# Patient Record
Sex: MALE | Race: WHITE | Employment: FULL TIME | URBAN - METROPOLITAN AREA
[De-identification: names, ages, dates, MRNs, and addresses within clinical notes are randomized per-mention and may not be internally consistent; named-entity substitution may affect disease eponyms.]

---

## 2017-01-03 ENCOUNTER — OFFICE VISIT (OUTPATIENT)
Dept: FAMILY MEDICINE CLINIC | Age: 27
End: 2017-01-03

## 2017-01-03 VITALS
BODY MASS INDEX: 27.49 KG/M2 | DIASTOLIC BLOOD PRESSURE: 80 MMHG | TEMPERATURE: 98.6 F | WEIGHT: 192 LBS | HEIGHT: 70 IN | HEART RATE: 90 BPM | OXYGEN SATURATION: 98 % | SYSTOLIC BLOOD PRESSURE: 122 MMHG

## 2017-01-03 DIAGNOSIS — J01.10 ACUTE NON-RECURRENT FRONTAL SINUSITIS: Primary | ICD-10-CM

## 2017-01-03 DIAGNOSIS — J45.20 RAD (REACTIVE AIRWAY DISEASE), MILD INTERMITTENT, UNCOMPLICATED: ICD-10-CM

## 2017-01-03 PROCEDURE — 99213 OFFICE O/P EST LOW 20 MIN: CPT | Performed by: PHYSICIAN ASSISTANT

## 2017-01-03 RX ORDER — FLUTICASONE PROPIONATE 50 MCG
1 SPRAY, SUSPENSION (ML) NASAL DAILY
Qty: 1 BOTTLE | Refills: 3 | Status: SHIPPED | OUTPATIENT
Start: 2017-01-03 | End: 2017-05-23 | Stop reason: SDUPTHER

## 2017-01-03 RX ORDER — CEFDINIR 300 MG/1
300 CAPSULE ORAL 2 TIMES DAILY
Qty: 20 CAPSULE | Refills: 0 | Status: SHIPPED | OUTPATIENT
Start: 2017-01-03 | End: 2017-01-13

## 2017-01-04 ENCOUNTER — TELEPHONE (OUTPATIENT)
Dept: FAMILY MEDICINE CLINIC | Age: 27
End: 2017-01-04

## 2017-01-26 DIAGNOSIS — F98.8 ADD (ATTENTION DEFICIT DISORDER): ICD-10-CM

## 2017-01-27 RX ORDER — DEXTROAMPHETAMINE SACCHARATE, AMPHETAMINE ASPARTATE, DEXTROAMPHETAMINE SULFATE AND AMPHETAMINE SULFATE 7.5; 7.5; 7.5; 7.5 MG/1; MG/1; MG/1; MG/1
30 TABLET ORAL DAILY
Qty: 30 TABLET | Refills: 0 | Status: SHIPPED | OUTPATIENT
Start: 2017-01-27 | End: 2017-02-23 | Stop reason: SDUPTHER

## 2017-02-23 DIAGNOSIS — F98.8 ADD (ATTENTION DEFICIT DISORDER): ICD-10-CM

## 2017-02-24 RX ORDER — DEXTROAMPHETAMINE SACCHARATE, AMPHETAMINE ASPARTATE, DEXTROAMPHETAMINE SULFATE AND AMPHETAMINE SULFATE 7.5; 7.5; 7.5; 7.5 MG/1; MG/1; MG/1; MG/1
30 TABLET ORAL DAILY
Qty: 30 TABLET | Refills: 0 | Status: SHIPPED | OUTPATIENT
Start: 2017-02-24 | End: 2017-03-22 | Stop reason: SDUPTHER

## 2017-03-22 DIAGNOSIS — F98.8 ADD (ATTENTION DEFICIT DISORDER): ICD-10-CM

## 2017-03-23 RX ORDER — DEXTROAMPHETAMINE SACCHARATE, AMPHETAMINE ASPARTATE, DEXTROAMPHETAMINE SULFATE AND AMPHETAMINE SULFATE 7.5; 7.5; 7.5; 7.5 MG/1; MG/1; MG/1; MG/1
30 TABLET ORAL DAILY
Qty: 30 TABLET | Refills: 0 | Status: SHIPPED | OUTPATIENT
Start: 2017-03-23 | End: 2017-04-18 | Stop reason: SDUPTHER

## 2017-04-14 DIAGNOSIS — J45.20 RAD (REACTIVE AIRWAY DISEASE), MILD INTERMITTENT, UNCOMPLICATED: Primary | ICD-10-CM

## 2017-04-18 DIAGNOSIS — F98.8 ADD (ATTENTION DEFICIT DISORDER): ICD-10-CM

## 2017-04-19 RX ORDER — DEXTROAMPHETAMINE SACCHARATE, AMPHETAMINE ASPARTATE, DEXTROAMPHETAMINE SULFATE AND AMPHETAMINE SULFATE 7.5; 7.5; 7.5; 7.5 MG/1; MG/1; MG/1; MG/1
30 TABLET ORAL DAILY
Qty: 30 TABLET | Refills: 0 | Status: SHIPPED | OUTPATIENT
Start: 2017-04-19 | End: 2017-05-05 | Stop reason: SDUPTHER

## 2017-05-05 ENCOUNTER — OFFICE VISIT (OUTPATIENT)
Dept: FAMILY MEDICINE CLINIC | Age: 27
End: 2017-05-05

## 2017-05-05 VITALS
TEMPERATURE: 98.2 F | OXYGEN SATURATION: 96 % | SYSTOLIC BLOOD PRESSURE: 122 MMHG | HEART RATE: 98 BPM | DIASTOLIC BLOOD PRESSURE: 66 MMHG | BODY MASS INDEX: 27.57 KG/M2 | WEIGHT: 192.6 LBS | HEIGHT: 70 IN

## 2017-05-05 DIAGNOSIS — F33.0 MAJOR DEPRESSIVE DISORDER, RECURRENT EPISODE, MILD (HCC): ICD-10-CM

## 2017-05-05 DIAGNOSIS — J45.31 RAD (REACTIVE AIRWAY DISEASE), MILD PERSISTENT, WITH ACUTE EXACERBATION: ICD-10-CM

## 2017-05-05 DIAGNOSIS — F98.8 ADD (ATTENTION DEFICIT DISORDER): Primary | ICD-10-CM

## 2017-05-05 PROCEDURE — 99213 OFFICE O/P EST LOW 20 MIN: CPT | Performed by: FAMILY MEDICINE

## 2017-05-05 RX ORDER — PREDNISONE 20 MG/1
TABLET ORAL
Qty: 18 TABLET | Refills: 0 | Status: SHIPPED | OUTPATIENT
Start: 2017-05-05 | End: 2018-01-11

## 2017-05-05 RX ORDER — MONTELUKAST SODIUM 10 MG/1
10 TABLET ORAL DAILY
Qty: 30 TABLET | Refills: 5 | Status: SHIPPED | OUTPATIENT
Start: 2017-05-05 | End: 2017-12-22 | Stop reason: SDUPTHER

## 2017-05-05 RX ORDER — BUPROPION HYDROCHLORIDE 300 MG/1
TABLET ORAL
Qty: 30 TABLET | Refills: 5 | Status: SHIPPED | OUTPATIENT
Start: 2017-05-05 | End: 2017-11-02 | Stop reason: SDUPTHER

## 2017-05-05 RX ORDER — DEXTROAMPHETAMINE SACCHARATE, AMPHETAMINE ASPARTATE, DEXTROAMPHETAMINE SULFATE AND AMPHETAMINE SULFATE 7.5; 7.5; 7.5; 7.5 MG/1; MG/1; MG/1; MG/1
30 TABLET ORAL DAILY
Qty: 30 TABLET | Refills: 0 | Status: SHIPPED | OUTPATIENT
Start: 2017-05-05 | End: 2017-06-16 | Stop reason: SDUPTHER

## 2017-05-08 ASSESSMENT — ENCOUNTER SYMPTOMS
ABDOMINAL PAIN: 0
SHORTNESS OF BREATH: 0

## 2017-05-09 ENCOUNTER — TELEPHONE (OUTPATIENT)
Dept: FAMILY MEDICINE CLINIC | Age: 27
End: 2017-05-09

## 2017-05-23 RX ORDER — FLUTICASONE PROPIONATE 50 MCG
1 SPRAY, SUSPENSION (ML) NASAL DAILY
Qty: 1 BOTTLE | Refills: 3 | Status: SHIPPED | OUTPATIENT
Start: 2017-05-23 | End: 2017-10-09 | Stop reason: SDUPTHER

## 2017-06-15 DIAGNOSIS — F98.8 ADD (ATTENTION DEFICIT DISORDER): ICD-10-CM

## 2017-06-16 RX ORDER — DEXTROAMPHETAMINE SACCHARATE, AMPHETAMINE ASPARTATE, DEXTROAMPHETAMINE SULFATE AND AMPHETAMINE SULFATE 7.5; 7.5; 7.5; 7.5 MG/1; MG/1; MG/1; MG/1
30 TABLET ORAL DAILY
Qty: 30 TABLET | Refills: 0 | Status: SHIPPED | OUTPATIENT
Start: 2017-06-16 | End: 2017-07-12 | Stop reason: SDUPTHER

## 2017-07-11 DIAGNOSIS — F98.8 ADD (ATTENTION DEFICIT DISORDER): ICD-10-CM

## 2017-07-12 RX ORDER — DEXTROAMPHETAMINE SACCHARATE, AMPHETAMINE ASPARTATE, DEXTROAMPHETAMINE SULFATE AND AMPHETAMINE SULFATE 7.5; 7.5; 7.5; 7.5 MG/1; MG/1; MG/1; MG/1
30 TABLET ORAL DAILY
Qty: 30 TABLET | Refills: 0 | Status: SHIPPED | OUTPATIENT
Start: 2017-07-12 | End: 2017-08-10 | Stop reason: SDUPTHER

## 2017-07-12 NOTE — TELEPHONE ENCOUNTER
Nurse - Please clarify with patient he's requesting both the Vyvanse and the Adderall (as only the Vyvanse was requested) - let me know if he does NOT want the Adderall too, since I printed out both.  (Rx(s) written and taken up front.)

## 2017-07-12 NOTE — TELEPHONE ENCOUNTER
Message left for patient to call office and clarify if he is requesting both Vyvanse and Adderall or just Vyvanse.

## 2017-07-25 ENCOUNTER — OFFICE VISIT (OUTPATIENT)
Dept: PSYCHOLOGY | Age: 27
End: 2017-07-25

## 2017-07-25 DIAGNOSIS — F43.10 PTSD (POST-TRAUMATIC STRESS DISORDER): Primary | ICD-10-CM

## 2017-07-25 PROCEDURE — 90791 PSYCH DIAGNOSTIC EVALUATION: CPT | Performed by: SOCIAL WORKER

## 2017-07-25 ASSESSMENT — PATIENT HEALTH QUESTIONNAIRE - PHQ9
1. LITTLE INTEREST OR PLEASURE IN DOING THINGS: 0
5. POOR APPETITE OR OVEREATING: 2
10. IF YOU CHECKED OFF ANY PROBLEMS, HOW DIFFICULT HAVE THESE PROBLEMS MADE IT FOR YOU TO DO YOUR WORK, TAKE CARE OF THINGS AT HOME, OR GET ALONG WITH OTHER PEOPLE: 1
3. TROUBLE FALLING OR STAYING ASLEEP: 3
8. MOVING OR SPEAKING SO SLOWLY THAT OTHER PEOPLE COULD HAVE NOTICED. OR THE OPPOSITE, BEING SO FIGETY OR RESTLESS THAT YOU HAVE BEEN MOVING AROUND A LOT MORE THAN USUAL: 1
9. THOUGHTS THAT YOU WOULD BE BETTER OFF DEAD, OR OF HURTING YOURSELF: 0
SUM OF ALL RESPONSES TO PHQ QUESTIONS 1-9: 11
SUM OF ALL RESPONSES TO PHQ9 QUESTIONS 1 & 2: 1
7. TROUBLE CONCENTRATING ON THINGS, SUCH AS READING THE NEWSPAPER OR WATCHING TELEVISION: 1
4. FEELING TIRED OR HAVING LITTLE ENERGY: 1
2. FEELING DOWN, DEPRESSED OR HOPELESS: 1
6. FEELING BAD ABOUT YOURSELF - OR THAT YOU ARE A FAILURE OR HAVE LET YOURSELF OR YOUR FAMILY DOWN: 2

## 2017-08-10 DIAGNOSIS — F98.8 ADD (ATTENTION DEFICIT DISORDER): ICD-10-CM

## 2017-08-10 RX ORDER — DEXTROAMPHETAMINE SACCHARATE, AMPHETAMINE ASPARTATE, DEXTROAMPHETAMINE SULFATE AND AMPHETAMINE SULFATE 7.5; 7.5; 7.5; 7.5 MG/1; MG/1; MG/1; MG/1
30 TABLET ORAL DAILY
Qty: 30 TABLET | Refills: 0 | Status: SHIPPED | OUTPATIENT
Start: 2017-08-10 | End: 2017-09-06 | Stop reason: SDUPTHER

## 2017-09-06 NOTE — TELEPHONE ENCOUNTER
Rosalina Scrape is requesting refill(s) Vyvanse  Last OV 5/5/17 (pertaining to medication)  LR 8/10/17 (per medication requested)  Next office visit scheduled or attempted No   If no, reason:  Not Made    Rosalina Scrape is requesting refill(s) Adderall   Last OV 5/5/17 (pertaining to medication)  LR 8/10/17 (per medication requested)  Next office visit scheduled or attempted No   If no, reason:  Not made

## 2017-09-08 RX ORDER — DEXTROAMPHETAMINE SACCHARATE, AMPHETAMINE ASPARTATE, DEXTROAMPHETAMINE SULFATE AND AMPHETAMINE SULFATE 7.5; 7.5; 7.5; 7.5 MG/1; MG/1; MG/1; MG/1
30 TABLET ORAL DAILY
Qty: 30 TABLET | Refills: 0 | Status: SHIPPED | OUTPATIENT
Start: 2017-09-08 | End: 2017-10-05 | Stop reason: SDUPTHER

## 2017-09-18 DIAGNOSIS — J45.20 RAD (REACTIVE AIRWAY DISEASE), MILD INTERMITTENT, UNCOMPLICATED: ICD-10-CM

## 2017-09-18 RX ORDER — ALBUTEROL SULFATE 90 UG/1
AEROSOL, METERED RESPIRATORY (INHALATION)
Qty: 18 G | Refills: 3 | Status: SHIPPED | OUTPATIENT
Start: 2017-09-18 | End: 2018-01-11 | Stop reason: SDUPTHER

## 2017-10-05 DIAGNOSIS — F98.8 ADD (ATTENTION DEFICIT DISORDER): ICD-10-CM

## 2017-10-06 RX ORDER — DEXTROAMPHETAMINE SACCHARATE, AMPHETAMINE ASPARTATE, DEXTROAMPHETAMINE SULFATE AND AMPHETAMINE SULFATE 7.5; 7.5; 7.5; 7.5 MG/1; MG/1; MG/1; MG/1
30 TABLET ORAL DAILY
Qty: 30 TABLET | Refills: 0 | Status: SHIPPED | OUTPATIENT
Start: 2017-10-06 | End: 2017-11-01 | Stop reason: SDUPTHER

## 2017-10-09 NOTE — TELEPHONE ENCOUNTER
Mac Nguyen is requesting refill(s)   Last OV 5/5/17 (pertaining to medication)  LR 5/23/ (per medication requested)  Next office visit scheduled or attempted No   If no, reason:  No scheduled

## 2017-10-10 RX ORDER — FLUTICASONE PROPIONATE 50 MCG
1-2 SPRAY, SUSPENSION (ML) NASAL DAILY
Qty: 1 BOTTLE | Refills: 12 | Status: SHIPPED | OUTPATIENT
Start: 2017-10-10 | End: 2018-10-24 | Stop reason: SDUPTHER

## 2017-11-02 DIAGNOSIS — F33.0 MAJOR DEPRESSIVE DISORDER, RECURRENT EPISODE, MILD (HCC): ICD-10-CM

## 2017-11-02 RX ORDER — BUPROPION HYDROCHLORIDE 300 MG/1
TABLET ORAL
Qty: 30 TABLET | Refills: 5 | Status: SHIPPED | OUTPATIENT
Start: 2017-11-02 | End: 2018-04-22 | Stop reason: SDUPTHER

## 2017-11-02 NOTE — TELEPHONE ENCOUNTER
Michael Tolbert is requesting refill(s)  wellbutrin  Last OV 5/5/17 (pertaining to medication)  LR 5/5/17 (per medication requested)  Next office visit scheduled or attempted No   If no, reason:

## 2017-12-01 NOTE — TELEPHONE ENCOUNTER
Arik Monsalve is requesting refill(s) Vyvanse  Last OV 05-05-17 (pertaining to medication)  LR 11-02-17 (per medication requested)  Next office visit scheduled or attempted No   If no, reason:  Not made

## 2017-12-04 RX ORDER — DEXTROAMPHETAMINE SACCHARATE, AMPHETAMINE ASPARTATE, DEXTROAMPHETAMINE SULFATE AND AMPHETAMINE SULFATE 7.5; 7.5; 7.5; 7.5 MG/1; MG/1; MG/1; MG/1
30 TABLET ORAL DAILY
Qty: 30 TABLET | Refills: 0 | Status: SHIPPED | OUTPATIENT
Start: 2017-12-04 | End: 2018-01-02 | Stop reason: SDUPTHER

## 2017-12-15 DIAGNOSIS — J45.20 RAD (REACTIVE AIRWAY DISEASE), MILD INTERMITTENT, UNCOMPLICATED: ICD-10-CM

## 2017-12-15 RX ORDER — ALBUTEROL SULFATE 90 UG/1
2 AEROSOL, METERED RESPIRATORY (INHALATION) EVERY 6 HOURS PRN
Qty: 1 INHALER | Refills: 5 | Status: SHIPPED | OUTPATIENT
Start: 2017-12-15 | End: 2018-01-11 | Stop reason: SDUPTHER

## 2017-12-15 NOTE — TELEPHONE ENCOUNTER
Jonathan Bautista is requesting refill(s) ventolin inhaler  Last OV 5/5/17 (pertaining to medication)  LR 9/18/17 (per medication requested)  Next office visit scheduled or attempted No   If no, reason:  Is due in May 2018

## 2017-12-22 DIAGNOSIS — J45.31 RAD (REACTIVE AIRWAY DISEASE), MILD PERSISTENT, WITH ACUTE EXACERBATION: ICD-10-CM

## 2017-12-22 NOTE — TELEPHONE ENCOUNTER
Michael Tolbert is requesting refill(s)   Last OV 5-5-17 (pertaining to medication)  LR 5-5-17 (per medication requested)  Next office visit scheduled or attempted No   If no, reason:

## 2017-12-26 RX ORDER — MONTELUKAST SODIUM 10 MG/1
TABLET ORAL
Qty: 30 TABLET | Refills: 5 | Status: SHIPPED | OUTPATIENT
Start: 2017-12-26 | End: 2018-06-24 | Stop reason: SDUPTHER

## 2018-01-02 DIAGNOSIS — F98.8 ATTENTION DEFICIT DISORDER, UNSPECIFIED HYPERACTIVITY PRESENCE: Primary | ICD-10-CM

## 2018-01-02 NOTE — TELEPHONE ENCOUNTER
Para General is requesting refill(s) Adderall   Last OV 5/5/17 (pertaining to medication)  LR 12/4/17 (per medication requested)  Next office visit scheduled or attempted No   If no, reason:  Not made    Para General is requesting refill(s) Vyvanace    Last OV 5/5/17 (pertaining to medication)  LR 12/4/17 (per medication requested)  Next office visit scheduled or attempted No   If no, reason:  Not made

## 2018-01-03 RX ORDER — DEXTROAMPHETAMINE SACCHARATE, AMPHETAMINE ASPARTATE, DEXTROAMPHETAMINE SULFATE AND AMPHETAMINE SULFATE 7.5; 7.5; 7.5; 7.5 MG/1; MG/1; MG/1; MG/1
30 TABLET ORAL DAILY
Qty: 30 TABLET | Refills: 0 | Status: SHIPPED | OUTPATIENT
Start: 2018-01-03 | End: 2018-02-01 | Stop reason: SDUPTHER

## 2018-01-11 ENCOUNTER — OFFICE VISIT (OUTPATIENT)
Dept: FAMILY MEDICINE CLINIC | Age: 28
End: 2018-01-11

## 2018-01-11 VITALS
SYSTOLIC BLOOD PRESSURE: 120 MMHG | BODY MASS INDEX: 26.66 KG/M2 | DIASTOLIC BLOOD PRESSURE: 82 MMHG | TEMPERATURE: 95 F | HEART RATE: 90 BPM | OXYGEN SATURATION: 93 % | WEIGHT: 185.8 LBS

## 2018-01-11 DIAGNOSIS — J01.90 ACUTE BACTERIAL SINUSITIS: Primary | ICD-10-CM

## 2018-01-11 DIAGNOSIS — R11.2 NAUSEA AND VOMITING, INTRACTABILITY OF VOMITING NOT SPECIFIED, UNSPECIFIED VOMITING TYPE: ICD-10-CM

## 2018-01-11 DIAGNOSIS — B96.89 ACUTE BACTERIAL SINUSITIS: Primary | ICD-10-CM

## 2018-01-11 PROCEDURE — 1036F TOBACCO NON-USER: CPT | Performed by: PHYSICIAN ASSISTANT

## 2018-01-11 PROCEDURE — 99213 OFFICE O/P EST LOW 20 MIN: CPT | Performed by: PHYSICIAN ASSISTANT

## 2018-01-11 PROCEDURE — G8417 CALC BMI ABV UP PARAM F/U: HCPCS | Performed by: PHYSICIAN ASSISTANT

## 2018-01-11 PROCEDURE — G8427 DOCREV CUR MEDS BY ELIG CLIN: HCPCS | Performed by: PHYSICIAN ASSISTANT

## 2018-01-11 PROCEDURE — G8484 FLU IMMUNIZE NO ADMIN: HCPCS | Performed by: PHYSICIAN ASSISTANT

## 2018-01-11 RX ORDER — AMOXICILLIN AND CLAVULANATE POTASSIUM 875; 125 MG/1; MG/1
1 TABLET, FILM COATED ORAL 2 TIMES DAILY
Qty: 14 TABLET | Refills: 0 | Status: SHIPPED | OUTPATIENT
Start: 2018-01-11 | End: 2018-01-18

## 2018-01-11 NOTE — PROGRESS NOTES
 omeprazole (PRILOSEC) 20 MG capsule Take 20 mg by mouth daily      albuterol (PROVENTIL) (2.5 MG/3ML) 0.083% nebulizer solution Take 3 mLs by nebulization every 6 hours as needed for Wheezing 25 each 2    Ibuprofen 200 MG CAPS Take 4 capsules by mouth daily.  Naproxen Sodium (ALEVE) 220 MG CAPS Take by mouth      calcium carbonate (TUMS) 500 MG chewable tablet Take 1 tablet by mouth daily. No current facility-administered medications for this visit. PHYSICAL EXAM     Vitals:    01/11/18 0937   BP: 120/82   Pulse: 90   Temp: 95 °F (35 °C)   SpO2: 93%   Weight: 185 lb 12.8 oz (84.3 kg)     APPEARANCE: Pleasant, friendly, well-nourished. No acute distress. HEENT: Normocephalic, atraumatic. EOMI,PERRLA. Conjunctiva pink and moist.  Sclera white. Ears: External ears uniform. Hearing intact. TM with bulge and opaque fluid. Nose: No septal deviation. Nares boggy w dried green exudates. Mouth: Oral mucosa moist. Throat is without erythema, exudates, edema. NECK: No lymphadenopathy or masses. HEART: Reg rate and rhythm. No murmurs, rubs, or gallops. LUNGS: Clear to auscultation. No wheezes, rales, or rhonchi. Equal chest percussion. ABDOMEN:  Soft, non-tender. MUSCULOSKELETOL:  No new deformity. No clubbing, cyanosis or edema. NEUROLOGIC: Normal gross and sensory exam.  SKIN: Warm, dry, normal turgor. Cap refill <3secs. No rashes, petechiae, purpura. ADDITIONAL STUDIES     Pertinent laboratory results and/or imaging reviewed. No orders of the defined types were placed in this encounter. IMPRESSION/ PLAN  1. Acute bacterial sinusitis    2. Nausea and vomiting, intractability of vomiting not specified, unspecified vomiting type       Diagnosis and instructions discussed in detail. Patient Instructions   Begin antibiotic and complete full course. Start OTC Mucinex twice a day for next few days. Increase water intake. Avoid alcohol.   Return to clinic it

## 2018-01-30 DIAGNOSIS — F98.8 ATTENTION DEFICIT DISORDER, UNSPECIFIED HYPERACTIVITY PRESENCE: ICD-10-CM

## 2018-01-30 RX ORDER — DEXTROAMPHETAMINE SACCHARATE, AMPHETAMINE ASPARTATE, DEXTROAMPHETAMINE SULFATE AND AMPHETAMINE SULFATE 7.5; 7.5; 7.5; 7.5 MG/1; MG/1; MG/1; MG/1
30 TABLET ORAL DAILY
Qty: 30 TABLET | Refills: 0 | Status: CANCELLED | OUTPATIENT
Start: 2018-01-30 | End: 2018-03-01

## 2018-01-30 NOTE — TELEPHONE ENCOUNTER
Abelardo Lanius is requesting refill(s) Vyvanse  Last OV 5/5/17 (pertaining to medication)  LR 1/3/18 (per medication requested)  Next office visit scheduled or attempted Yes   If no, reason:  2/1/18    Willaim Lanius is requesting refill(s) Adderall  Last OV 5/5/17 (pertaining to medication)  LR 1/3/18 (per medication requested)  Next office visit scheduled or attempted Yes   If no, reason:  2/1/18

## 2018-02-01 ENCOUNTER — OFFICE VISIT (OUTPATIENT)
Dept: FAMILY MEDICINE CLINIC | Age: 28
End: 2018-02-01

## 2018-02-01 VITALS
BODY MASS INDEX: 26.88 KG/M2 | WEIGHT: 187.8 LBS | HEIGHT: 70 IN | DIASTOLIC BLOOD PRESSURE: 74 MMHG | OXYGEN SATURATION: 98 % | HEART RATE: 101 BPM | SYSTOLIC BLOOD PRESSURE: 122 MMHG

## 2018-02-01 DIAGNOSIS — B08.1 MOLLUSCUM CONTAGIOSUM: Primary | ICD-10-CM

## 2018-02-01 DIAGNOSIS — F98.8 ATTENTION DEFICIT DISORDER, UNSPECIFIED HYPERACTIVITY PRESENCE: ICD-10-CM

## 2018-02-01 DIAGNOSIS — K21.9 GASTROESOPHAGEAL REFLUX DISEASE WITHOUT ESOPHAGITIS: ICD-10-CM

## 2018-02-01 PROCEDURE — 1036F TOBACCO NON-USER: CPT | Performed by: FAMILY MEDICINE

## 2018-02-01 PROCEDURE — G8427 DOCREV CUR MEDS BY ELIG CLIN: HCPCS | Performed by: FAMILY MEDICINE

## 2018-02-01 PROCEDURE — 99213 OFFICE O/P EST LOW 20 MIN: CPT | Performed by: FAMILY MEDICINE

## 2018-02-01 PROCEDURE — G8484 FLU IMMUNIZE NO ADMIN: HCPCS | Performed by: FAMILY MEDICINE

## 2018-02-01 PROCEDURE — G8417 CALC BMI ABV UP PARAM F/U: HCPCS | Performed by: FAMILY MEDICINE

## 2018-02-01 RX ORDER — OMEPRAZOLE 40 MG/1
40 CAPSULE, DELAYED RELEASE ORAL DAILY
Qty: 30 CAPSULE | Refills: 12 | Status: SHIPPED | OUTPATIENT
Start: 2018-02-01 | End: 2019-02-08 | Stop reason: SDUPTHER

## 2018-02-01 RX ORDER — DEXTROAMPHETAMINE SACCHARATE, AMPHETAMINE ASPARTATE, DEXTROAMPHETAMINE SULFATE AND AMPHETAMINE SULFATE 7.5; 7.5; 7.5; 7.5 MG/1; MG/1; MG/1; MG/1
30 TABLET ORAL DAILY
Qty: 30 TABLET | Refills: 0 | Status: SHIPPED | OUTPATIENT
Start: 2018-02-01 | End: 2018-03-01 | Stop reason: SDUPTHER

## 2018-02-01 RX ORDER — IMIQUIMOD 12.5 MG/.25G
CREAM TOPICAL
Qty: 12 EACH | Refills: 2 | Status: SHIPPED | OUTPATIENT
Start: 2018-02-01 | End: 2018-02-08

## 2018-02-01 NOTE — PROGRESS NOTES
Subjective:      Patient ID: Elisa Chacko is a 32 y.o. male. HPI   C/o bumps on lower belly, got them from his girlfriend, who works with small children, which is where she got them. No pain, no itching. Needs refills for GERD and for his ADD. Review of Systems   Constitutional: Negative for chills and fever. Respiratory: Negative for cough and shortness of breath. Cardiovascular: Negative for chest pain. Skin: Positive for rash. Objective:   Physical Exam   Constitutional: He appears well-developed and well-nourished. Cardiovascular: Normal rate, regular rhythm and normal heart sounds. Pulmonary/Chest: Effort normal and breath sounds normal. No respiratory distress. Neurological: He is alert. Skin:   Several small domed slightly shiny papules around the umbilicus, with umbilicated centers. Nursing note and vitals reviewed. Assessment:      Encounter Diagnoses   Name Primary?  Molluscum contagiosum Yes    Attention deficit disorder, unspecified hyperactivity presence     Gastroesophageal reflux disease without esophagitis            Plan:      Per orders - await results. Discussed options for treatment of the molluscum - he wants to try topical medicine, so prescribed Aldara as directed. Refilled omeprazole, also refilled combo Vyvanse in a.m., and Adderall in p.m.  if doing well, then repeat office visit in 6-12 months, sooner as needed.

## 2018-02-08 ASSESSMENT — ENCOUNTER SYMPTOMS
COUGH: 0
SHORTNESS OF BREATH: 0

## 2018-03-01 DIAGNOSIS — F98.8 ATTENTION DEFICIT DISORDER, UNSPECIFIED HYPERACTIVITY PRESENCE: ICD-10-CM

## 2018-03-01 NOTE — TELEPHONE ENCOUNTER
Mike Hidalgo is requesting refill(s) adderall  Last OV 5/5/17 (pertaining to medication)  LR 2/1/18 (per medication requested)  Next office visit scheduled or attempted No   If no, reason:  Not made    Mike Hidalgo is requesting refill(s) vyvanse   Last OV 5/5/17 (pertaining to medication)  LR 2/1/18 (per medication requested)  Next office visit scheduled or attempted No   If no, reason:  Not made

## 2018-03-02 RX ORDER — DEXTROAMPHETAMINE SACCHARATE, AMPHETAMINE ASPARTATE, DEXTROAMPHETAMINE SULFATE AND AMPHETAMINE SULFATE 7.5; 7.5; 7.5; 7.5 MG/1; MG/1; MG/1; MG/1
30 TABLET ORAL DAILY
Qty: 30 TABLET | Refills: 0 | Status: SHIPPED | OUTPATIENT
Start: 2018-03-02 | End: 2018-03-29 | Stop reason: SDUPTHER

## 2018-03-29 DIAGNOSIS — F98.8 ATTENTION DEFICIT DISORDER, UNSPECIFIED HYPERACTIVITY PRESENCE: ICD-10-CM

## 2018-03-29 RX ORDER — DEXTROAMPHETAMINE SACCHARATE, AMPHETAMINE ASPARTATE, DEXTROAMPHETAMINE SULFATE AND AMPHETAMINE SULFATE 7.5; 7.5; 7.5; 7.5 MG/1; MG/1; MG/1; MG/1
30 TABLET ORAL DAILY
Qty: 30 TABLET | Refills: 0 | Status: SHIPPED | OUTPATIENT
Start: 2018-03-29 | End: 2018-04-27 | Stop reason: SDUPTHER

## 2018-04-22 DIAGNOSIS — F33.0 MAJOR DEPRESSIVE DISORDER, RECURRENT EPISODE, MILD (HCC): ICD-10-CM

## 2018-04-24 RX ORDER — BUPROPION HYDROCHLORIDE 300 MG/1
TABLET ORAL
Qty: 30 TABLET | Refills: 5 | Status: SHIPPED | OUTPATIENT
Start: 2018-04-24 | End: 2018-10-24 | Stop reason: SDUPTHER

## 2018-04-27 DIAGNOSIS — F98.8 ATTENTION DEFICIT DISORDER, UNSPECIFIED HYPERACTIVITY PRESENCE: ICD-10-CM

## 2018-04-30 RX ORDER — DEXTROAMPHETAMINE SACCHARATE, AMPHETAMINE ASPARTATE, DEXTROAMPHETAMINE SULFATE AND AMPHETAMINE SULFATE 7.5; 7.5; 7.5; 7.5 MG/1; MG/1; MG/1; MG/1
30 TABLET ORAL DAILY
Qty: 30 TABLET | Refills: 0 | Status: SHIPPED | OUTPATIENT
Start: 2018-04-30 | End: 2018-05-24 | Stop reason: SDUPTHER

## 2018-05-24 DIAGNOSIS — F98.8 ATTENTION DEFICIT DISORDER, UNSPECIFIED HYPERACTIVITY PRESENCE: ICD-10-CM

## 2018-05-25 RX ORDER — DEXTROAMPHETAMINE SACCHARATE, AMPHETAMINE ASPARTATE, DEXTROAMPHETAMINE SULFATE AND AMPHETAMINE SULFATE 7.5; 7.5; 7.5; 7.5 MG/1; MG/1; MG/1; MG/1
30 TABLET ORAL DAILY
Qty: 30 TABLET | Refills: 0 | Status: SHIPPED | OUTPATIENT
Start: 2018-05-25 | End: 2018-06-27 | Stop reason: SDUPTHER

## 2018-06-24 DIAGNOSIS — J45.20 RAD (REACTIVE AIRWAY DISEASE), MILD INTERMITTENT, UNCOMPLICATED: ICD-10-CM

## 2018-06-24 DIAGNOSIS — J45.31 RAD (REACTIVE AIRWAY DISEASE), MILD PERSISTENT, WITH ACUTE EXACERBATION: ICD-10-CM

## 2018-06-25 RX ORDER — MONTELUKAST SODIUM 10 MG/1
TABLET ORAL
Qty: 30 TABLET | Refills: 5 | Status: SHIPPED | OUTPATIENT
Start: 2018-06-25 | End: 2018-12-19 | Stop reason: SDUPTHER

## 2018-06-27 DIAGNOSIS — F98.8 ATTENTION DEFICIT DISORDER, UNSPECIFIED HYPERACTIVITY PRESENCE: ICD-10-CM

## 2018-06-27 RX ORDER — DEXTROAMPHETAMINE SACCHARATE, AMPHETAMINE ASPARTATE, DEXTROAMPHETAMINE SULFATE AND AMPHETAMINE SULFATE 7.5; 7.5; 7.5; 7.5 MG/1; MG/1; MG/1; MG/1
30 TABLET ORAL DAILY
Qty: 30 TABLET | Refills: 0 | Status: SHIPPED | OUTPATIENT
Start: 2018-06-27 | End: 2018-07-25 | Stop reason: SDUPTHER

## 2018-07-25 ENCOUNTER — TELEPHONE (OUTPATIENT)
Dept: FAMILY MEDICINE CLINIC | Age: 28
End: 2018-07-25

## 2018-07-25 DIAGNOSIS — F98.8 ATTENTION DEFICIT DISORDER, UNSPECIFIED HYPERACTIVITY PRESENCE: ICD-10-CM

## 2018-07-26 RX ORDER — DEXTROAMPHETAMINE SACCHARATE, AMPHETAMINE ASPARTATE, DEXTROAMPHETAMINE SULFATE AND AMPHETAMINE SULFATE 7.5; 7.5; 7.5; 7.5 MG/1; MG/1; MG/1; MG/1
TABLET ORAL
Qty: 30 TABLET | Refills: 0 | Status: SHIPPED | OUTPATIENT
Start: 2018-07-26 | End: 2018-08-23 | Stop reason: SDUPTHER

## 2018-08-23 DIAGNOSIS — F98.8 ATTENTION DEFICIT DISORDER, UNSPECIFIED HYPERACTIVITY PRESENCE: ICD-10-CM

## 2018-08-27 RX ORDER — DEXTROAMPHETAMINE SACCHARATE, AMPHETAMINE ASPARTATE, DEXTROAMPHETAMINE SULFATE AND AMPHETAMINE SULFATE 7.5; 7.5; 7.5; 7.5 MG/1; MG/1; MG/1; MG/1
TABLET ORAL
Qty: 30 TABLET | Refills: 0 | Status: SHIPPED | OUTPATIENT
Start: 2018-08-27 | End: 2018-09-25 | Stop reason: SDUPTHER

## 2018-09-07 DIAGNOSIS — J45.20 RAD (REACTIVE AIRWAY DISEASE), MILD INTERMITTENT, UNCOMPLICATED: ICD-10-CM

## 2018-09-25 DIAGNOSIS — F98.8 ATTENTION DEFICIT DISORDER, UNSPECIFIED HYPERACTIVITY PRESENCE: ICD-10-CM

## 2018-09-28 RX ORDER — DEXTROAMPHETAMINE SACCHARATE, AMPHETAMINE ASPARTATE, DEXTROAMPHETAMINE SULFATE AND AMPHETAMINE SULFATE 7.5; 7.5; 7.5; 7.5 MG/1; MG/1; MG/1; MG/1
TABLET ORAL
Qty: 30 TABLET | Refills: 0 | Status: SHIPPED | OUTPATIENT
Start: 2018-09-28 | End: 2018-10-22 | Stop reason: SDUPTHER

## 2018-09-28 NOTE — TELEPHONE ENCOUNTER
Rx(s) written and taken up front. Nurse - per patient request, please let him know he can  his prescriptions at noon, as they are now ready for pickup.

## 2018-10-22 ENCOUNTER — TELEPHONE (OUTPATIENT)
Dept: FAMILY MEDICINE CLINIC | Age: 28
End: 2018-10-22

## 2018-10-22 DIAGNOSIS — F98.8 ATTENTION DEFICIT DISORDER, UNSPECIFIED HYPERACTIVITY PRESENCE: ICD-10-CM

## 2018-10-22 RX ORDER — DEXTROAMPHETAMINE SACCHARATE, AMPHETAMINE ASPARTATE, DEXTROAMPHETAMINE SULFATE AND AMPHETAMINE SULFATE 7.5; 7.5; 7.5; 7.5 MG/1; MG/1; MG/1; MG/1
TABLET ORAL
Qty: 30 TABLET | Refills: 0 | Status: SHIPPED | OUTPATIENT
Start: 2018-10-22 | End: 2018-11-19 | Stop reason: SDUPTHER

## 2018-10-24 DIAGNOSIS — F33.0 MAJOR DEPRESSIVE DISORDER, RECURRENT EPISODE, MILD (HCC): ICD-10-CM

## 2018-10-25 RX ORDER — BUPROPION HYDROCHLORIDE 300 MG/1
TABLET ORAL
Qty: 30 TABLET | Refills: 5 | Status: SHIPPED | OUTPATIENT
Start: 2018-10-25 | End: 2019-04-04 | Stop reason: SDUPTHER

## 2018-10-25 RX ORDER — FLUTICASONE PROPIONATE 50 MCG
SPRAY, SUSPENSION (ML) NASAL
Qty: 1 BOTTLE | Refills: 5 | Status: SHIPPED | OUTPATIENT
Start: 2018-10-25 | End: 2019-05-06 | Stop reason: SDUPTHER

## 2018-11-05 ENCOUNTER — OFFICE VISIT (OUTPATIENT)
Dept: FAMILY MEDICINE CLINIC | Age: 28
End: 2018-11-05
Payer: MEDICAID

## 2018-11-05 VITALS
HEART RATE: 115 BPM | SYSTOLIC BLOOD PRESSURE: 137 MMHG | HEIGHT: 70 IN | OXYGEN SATURATION: 98 % | DIASTOLIC BLOOD PRESSURE: 81 MMHG | BODY MASS INDEX: 26.03 KG/M2 | WEIGHT: 181.8 LBS

## 2018-11-05 DIAGNOSIS — H72.91 PERFORATION OF RIGHT TYMPANIC MEMBRANE: ICD-10-CM

## 2018-11-05 DIAGNOSIS — J45.30 MILD PERSISTENT ASTHMATIC BRONCHITIS WITHOUT COMPLICATION: ICD-10-CM

## 2018-11-05 DIAGNOSIS — K21.9 GASTROESOPHAGEAL REFLUX DISEASE WITHOUT ESOPHAGITIS: ICD-10-CM

## 2018-11-05 DIAGNOSIS — F98.8 ATTENTION DEFICIT DISORDER, UNSPECIFIED HYPERACTIVITY PRESENCE: Primary | ICD-10-CM

## 2018-11-05 DIAGNOSIS — Z23 NEED FOR INFLUENZA VACCINATION: ICD-10-CM

## 2018-11-05 PROCEDURE — G8482 FLU IMMUNIZE ORDER/ADMIN: HCPCS | Performed by: FAMILY MEDICINE

## 2018-11-05 PROCEDURE — 1036F TOBACCO NON-USER: CPT | Performed by: FAMILY MEDICINE

## 2018-11-05 PROCEDURE — G8417 CALC BMI ABV UP PARAM F/U: HCPCS | Performed by: FAMILY MEDICINE

## 2018-11-05 PROCEDURE — 99213 OFFICE O/P EST LOW 20 MIN: CPT | Performed by: FAMILY MEDICINE

## 2018-11-05 PROCEDURE — 90686 IIV4 VACC NO PRSV 0.5 ML IM: CPT | Performed by: FAMILY MEDICINE

## 2018-11-05 PROCEDURE — G0008 ADMIN INFLUENZA VIRUS VAC: HCPCS | Performed by: FAMILY MEDICINE

## 2018-11-05 PROCEDURE — G8427 DOCREV CUR MEDS BY ELIG CLIN: HCPCS | Performed by: FAMILY MEDICINE

## 2018-11-05 ASSESSMENT — PATIENT HEALTH QUESTIONNAIRE - PHQ9
1. LITTLE INTEREST OR PLEASURE IN DOING THINGS: 0
SUM OF ALL RESPONSES TO PHQ QUESTIONS 1-9: 0
SUM OF ALL RESPONSES TO PHQ QUESTIONS 1-9: 0

## 2018-11-06 ASSESSMENT — ENCOUNTER SYMPTOMS
SHORTNESS OF BREATH: 0
ABDOMINAL PAIN: 0

## 2018-11-06 NOTE — PATIENT INSTRUCTIONS
ADD and GERD stable, continue present medication. Follow-up with ENT as discussed/referred. If overall doing well, advise repeat office visit within one year, sooner as needed.

## 2018-11-19 DIAGNOSIS — F98.8 ATTENTION DEFICIT DISORDER, UNSPECIFIED HYPERACTIVITY PRESENCE: ICD-10-CM

## 2018-11-19 RX ORDER — DEXTROAMPHETAMINE SACCHARATE, AMPHETAMINE ASPARTATE, DEXTROAMPHETAMINE SULFATE AND AMPHETAMINE SULFATE 7.5; 7.5; 7.5; 7.5 MG/1; MG/1; MG/1; MG/1
TABLET ORAL
Qty: 30 TABLET | Refills: 0 | Status: SHIPPED | OUTPATIENT
Start: 2018-11-19 | End: 2018-12-17 | Stop reason: SDUPTHER

## 2018-11-19 NOTE — TELEPHONE ENCOUNTER
I spoke with the pt and verified with him if he just needed the Adderall. He stated that he needed the Adderall AND Vyvanse. He will  the scripts later today or tomorrow.

## 2018-12-17 DIAGNOSIS — F98.8 ATTENTION DEFICIT DISORDER, UNSPECIFIED HYPERACTIVITY PRESENCE: ICD-10-CM

## 2018-12-17 NOTE — TELEPHONE ENCOUNTER
Patient requesting a medication refill.   Medication amphetamine-dextroamphetamine (ADDERALL, 30MG,) 30 MG tablet and lisdexamfetamine (VYVANSE) 70 MG capsule  Pharmacy    Last office visit:11/05/2018  Next office visit: Visit date not found

## 2018-12-19 DIAGNOSIS — J45.31 RAD (REACTIVE AIRWAY DISEASE), MILD PERSISTENT, WITH ACUTE EXACERBATION: ICD-10-CM

## 2018-12-19 RX ORDER — DEXTROAMPHETAMINE SACCHARATE, AMPHETAMINE ASPARTATE, DEXTROAMPHETAMINE SULFATE AND AMPHETAMINE SULFATE 7.5; 7.5; 7.5; 7.5 MG/1; MG/1; MG/1; MG/1
TABLET ORAL
Qty: 30 TABLET | Refills: 0 | Status: SHIPPED | OUTPATIENT
Start: 2018-12-19 | End: 2019-01-14 | Stop reason: SDUPTHER

## 2018-12-21 RX ORDER — MONTELUKAST SODIUM 10 MG/1
TABLET ORAL
Qty: 30 TABLET | Refills: 12 | Status: SHIPPED | OUTPATIENT
Start: 2018-12-21 | End: 2021-04-16 | Stop reason: SDUPTHER

## 2019-01-14 DIAGNOSIS — F98.8 ATTENTION DEFICIT DISORDER, UNSPECIFIED HYPERACTIVITY PRESENCE: ICD-10-CM

## 2019-01-14 NOTE — TELEPHONE ENCOUNTER
[facility] Records [phone] (home)    is requesting refill(s) of medication Adderall to preferred pharmacy [address] 11-05-18 (pertaining to medication)   Last refill 12-19-18 (per medication requested)  Next office visit scheduled or attempted Yes  Date 05-16-19  If No, reason made

## 2019-01-16 RX ORDER — DEXTROAMPHETAMINE SACCHARATE, AMPHETAMINE ASPARTATE, DEXTROAMPHETAMINE SULFATE AND AMPHETAMINE SULFATE 7.5; 7.5; 7.5; 7.5 MG/1; MG/1; MG/1; MG/1
TABLET ORAL
Qty: 30 TABLET | Refills: 0 | Status: SHIPPED | OUTPATIENT
Start: 2019-01-16 | End: 2019-02-11 | Stop reason: SDUPTHER

## 2019-01-16 NOTE — TELEPHONE ENCOUNTER
Adderall and Vyvanse Rxs written and taken up front. Nurse - Please verify that patient wanted BOTH the Adderall AND the Vyvanse Rx's. If so, both are ready.

## 2019-01-27 DIAGNOSIS — J45.20 RAD (REACTIVE AIRWAY DISEASE), MILD INTERMITTENT, UNCOMPLICATED: ICD-10-CM

## 2019-02-11 DIAGNOSIS — F98.8 ATTENTION DEFICIT DISORDER, UNSPECIFIED HYPERACTIVITY PRESENCE: ICD-10-CM

## 2019-02-11 RX ORDER — OMEPRAZOLE 40 MG/1
CAPSULE, DELAYED RELEASE ORAL
Qty: 30 CAPSULE | Refills: 5 | Status: SHIPPED | OUTPATIENT
Start: 2019-02-11 | End: 2019-08-06 | Stop reason: SDUPTHER

## 2019-02-11 RX ORDER — OMEPRAZOLE 40 MG/1
CAPSULE, DELAYED RELEASE ORAL
Qty: 30 CAPSULE | Refills: 0 | OUTPATIENT
Start: 2019-02-11

## 2019-02-12 RX ORDER — DEXTROAMPHETAMINE SACCHARATE, AMPHETAMINE ASPARTATE, DEXTROAMPHETAMINE SULFATE AND AMPHETAMINE SULFATE 7.5; 7.5; 7.5; 7.5 MG/1; MG/1; MG/1; MG/1
TABLET ORAL
Qty: 30 TABLET | Refills: 0 | Status: SHIPPED | OUTPATIENT
Start: 2019-02-12 | End: 2019-03-14 | Stop reason: SDUPTHER

## 2019-03-14 DIAGNOSIS — F98.8 ATTENTION DEFICIT DISORDER, UNSPECIFIED HYPERACTIVITY PRESENCE: ICD-10-CM

## 2019-03-18 ENCOUNTER — APPOINTMENT (OUTPATIENT)
Dept: GENERAL RADIOLOGY | Age: 29
End: 2019-03-18
Payer: MEDICAID

## 2019-03-18 ENCOUNTER — HOSPITAL ENCOUNTER (EMERGENCY)
Age: 29
Discharge: HOME | End: 2019-03-18
Attending: EMERGENCY MEDICINE
Payer: MEDICAID

## 2019-03-18 VITALS
WEIGHT: 185 LBS | HEIGHT: 70 IN | HEART RATE: 111 BPM | SYSTOLIC BLOOD PRESSURE: 140 MMHG | OXYGEN SATURATION: 97 % | DIASTOLIC BLOOD PRESSURE: 74 MMHG | BODY MASS INDEX: 26.48 KG/M2 | TEMPERATURE: 98.4 F | RESPIRATION RATE: 17 BRPM

## 2019-03-18 DIAGNOSIS — S93.401A MODERATE RIGHT ANKLE SPRAIN, INITIAL ENCOUNTER: Primary | ICD-10-CM

## 2019-03-18 PROCEDURE — 6360000002 HC RX W HCPCS: Performed by: EMERGENCY MEDICINE

## 2019-03-18 PROCEDURE — 73610 X-RAY EXAM OF ANKLE: CPT

## 2019-03-18 PROCEDURE — 96372 THER/PROPH/DIAG INJ SC/IM: CPT

## 2019-03-18 PROCEDURE — 99283 EMERGENCY DEPT VISIT LOW MDM: CPT

## 2019-03-18 PROCEDURE — 4500000023 HC ED LEVEL 3 PROCEDURE

## 2019-03-18 RX ORDER — TRAMADOL HYDROCHLORIDE 50 MG/1
50 TABLET ORAL EVERY 6 HOURS PRN
Qty: 15 TABLET | Refills: 0 | Status: SHIPPED | OUTPATIENT
Start: 2019-03-18 | End: 2019-03-23

## 2019-03-18 RX ORDER — NAPROXEN 500 MG/1
500 TABLET ORAL 2 TIMES DAILY WITH MEALS
Qty: 30 TABLET | Refills: 0 | Status: SHIPPED | OUTPATIENT
Start: 2019-03-18 | End: 2020-03-11

## 2019-03-18 RX ORDER — KETOROLAC TROMETHAMINE 30 MG/ML
30 INJECTION, SOLUTION INTRAMUSCULAR; INTRAVENOUS ONCE
Status: COMPLETED | OUTPATIENT
Start: 2019-03-18 | End: 2019-03-18

## 2019-03-18 RX ORDER — DEXTROAMPHETAMINE SACCHARATE, AMPHETAMINE ASPARTATE, DEXTROAMPHETAMINE SULFATE AND AMPHETAMINE SULFATE 7.5; 7.5; 7.5; 7.5 MG/1; MG/1; MG/1; MG/1
TABLET ORAL
Qty: 30 TABLET | Refills: 0 | Status: SHIPPED | OUTPATIENT
Start: 2019-03-18 | End: 2019-04-10 | Stop reason: SDUPTHER

## 2019-03-18 RX ADMIN — KETOROLAC TROMETHAMINE 30 MG: 30 INJECTION, SOLUTION INTRAMUSCULAR at 00:43

## 2019-03-18 ASSESSMENT — PAIN DESCRIPTION - LOCATION: LOCATION: ANKLE

## 2019-03-18 ASSESSMENT — PAIN SCALES - GENERAL: PAINLEVEL_OUTOF10: 9

## 2019-03-18 ASSESSMENT — PAIN DESCRIPTION - PAIN TYPE: TYPE: ACUTE PAIN

## 2019-03-18 ASSESSMENT — PAIN DESCRIPTION - ORIENTATION: ORIENTATION: RIGHT

## 2019-03-18 ASSESSMENT — PAIN DESCRIPTION - DESCRIPTORS: DESCRIPTORS: SHARP

## 2019-03-19 ENCOUNTER — OFFICE VISIT (OUTPATIENT)
Dept: ORTHOPEDIC SURGERY | Age: 29
End: 2019-03-19
Payer: MEDICAID

## 2019-03-19 VITALS — HEIGHT: 70 IN | BODY MASS INDEX: 26.48 KG/M2 | WEIGHT: 184.97 LBS

## 2019-03-19 DIAGNOSIS — S93.491A SPRAIN OF ANTERIOR TALOFIBULAR LIGAMENT OF RIGHT ANKLE, INITIAL ENCOUNTER: Primary | ICD-10-CM

## 2019-03-19 PROCEDURE — G8427 DOCREV CUR MEDS BY ELIG CLIN: HCPCS | Performed by: ORTHOPAEDIC SURGERY

## 2019-03-19 PROCEDURE — G8482 FLU IMMUNIZE ORDER/ADMIN: HCPCS | Performed by: ORTHOPAEDIC SURGERY

## 2019-03-19 PROCEDURE — 1036F TOBACCO NON-USER: CPT | Performed by: ORTHOPAEDIC SURGERY

## 2019-03-19 PROCEDURE — G8417 CALC BMI ABV UP PARAM F/U: HCPCS | Performed by: ORTHOPAEDIC SURGERY

## 2019-03-19 PROCEDURE — 99203 OFFICE O/P NEW LOW 30 MIN: CPT | Performed by: ORTHOPAEDIC SURGERY

## 2019-03-19 PROCEDURE — L4361 PNEUMA/VAC WALK BOOT PRE OTS: HCPCS | Performed by: ORTHOPAEDIC SURGERY

## 2019-03-20 ENCOUNTER — TELEPHONE (OUTPATIENT)
Dept: ORTHOPEDIC SURGERY | Age: 29
End: 2019-03-20

## 2019-04-04 DIAGNOSIS — F33.0 MAJOR DEPRESSIVE DISORDER, RECURRENT EPISODE, MILD (HCC): ICD-10-CM

## 2019-04-05 RX ORDER — BUPROPION HYDROCHLORIDE 300 MG/1
TABLET ORAL
Qty: 30 TABLET | Refills: 3 | Status: SHIPPED | OUTPATIENT
Start: 2019-04-05 | End: 2019-07-25 | Stop reason: SDUPTHER

## 2019-04-10 DIAGNOSIS — F98.8 ATTENTION DEFICIT DISORDER, UNSPECIFIED HYPERACTIVITY PRESENCE: ICD-10-CM

## 2019-04-10 NOTE — TELEPHONE ENCOUNTER
Ronn Artist [phone] (home)    is requesting refill(s) of medication amphetamine-dextroamphetamine (ADDERALL, 30MG,) 30 MG tablet     to preferred pharmacy will pick them up    Last OV 11/5/18 (pertaining to medication)   Last refill 3/18/19 (per medication requested)  Next office visit scheduled or attempted yes   Date 5/16/19        Ronn Artist [phone] (home)    is requesting refill(s) of medication lisdexamfetamine (VYVANSE) 70 MG capsule      to preferred pharmacy will     Last OV 11/5/18 (pertaining to medication)   Last refill 3/18/19 (per medication requested)  Next office visit scheduled or attempted YES  Date 5/16/19

## 2019-04-12 RX ORDER — DEXTROAMPHETAMINE SACCHARATE, AMPHETAMINE ASPARTATE, DEXTROAMPHETAMINE SULFATE AND AMPHETAMINE SULFATE 7.5; 7.5; 7.5; 7.5 MG/1; MG/1; MG/1; MG/1
TABLET ORAL
Qty: 30 TABLET | Refills: 0 | Status: SHIPPED | OUTPATIENT
Start: 2019-04-12 | End: 2019-05-14 | Stop reason: SDUPTHER

## 2019-05-06 RX ORDER — FLUTICASONE PROPIONATE 50 MCG
SPRAY, SUSPENSION (ML) NASAL
Qty: 1 BOTTLE | Refills: 5 | Status: SHIPPED | OUTPATIENT
Start: 2019-05-06 | End: 2019-05-16 | Stop reason: SDUPTHER

## 2019-05-06 NOTE — TELEPHONE ENCOUNTER
J Carlos Mcallister is requesting refill(s) flonae  Last OV 11/5/18 (pertaining to medication)  LR 10/25/18 (per medication requested)  Next office visit scheduled or attempted Yes   If no, reason:  Pt is scheduled for 5/16/19

## 2019-05-14 ENCOUNTER — TELEPHONE (OUTPATIENT)
Dept: FAMILY MEDICINE CLINIC | Age: 29
End: 2019-05-14

## 2019-05-14 DIAGNOSIS — F98.8 ATTENTION DEFICIT DISORDER, UNSPECIFIED HYPERACTIVITY PRESENCE: ICD-10-CM

## 2019-05-14 NOTE — TELEPHONE ENCOUNTER
Adelina Henry [phone] (home)    is requesting refill(s) of medication lisdexamfetamine (VYVANSE) 70 MG capsule     to [facility] pharmacy [facility] [address] [facility], [address] [place] [address] [phone] Javier Stevens [phone]    Last OV 11/5/18 (pertaining to medication)   Last refill 4/12/19 (per medication requested)  Next office visit scheduled or attempted Yes  Date 5/16/19  If No, reason MADE    Adelina Henry [phone] (home)    is requesting refill(s) of medication amphetamine-dextroamphetamine (ADDERALL, 30MG,) 30 MG tablet     to preferred pharmacy [place] [address] [facility] [address] [facility], [address] [facility] Javier Stevens [phone]      Last OV 11/5/18 (pertaining to medication)   Last refill 4/12/19 (per medication requested)  Next office visit scheduled or attempted Yes  Date 5/16/19  If No, reason MADE

## 2019-05-15 RX ORDER — DEXTROAMPHETAMINE SACCHARATE, AMPHETAMINE ASPARTATE, DEXTROAMPHETAMINE SULFATE AND AMPHETAMINE SULFATE 7.5; 7.5; 7.5; 7.5 MG/1; MG/1; MG/1; MG/1
TABLET ORAL
Qty: 30 TABLET | Refills: 0 | Status: SHIPPED | OUTPATIENT
Start: 2019-05-15 | End: 2019-06-13 | Stop reason: SDUPTHER

## 2019-05-16 ENCOUNTER — OFFICE VISIT (OUTPATIENT)
Dept: FAMILY MEDICINE CLINIC | Age: 29
End: 2019-05-16
Payer: MEDICAID

## 2019-05-16 VITALS
HEIGHT: 70 IN | WEIGHT: 190.4 LBS | SYSTOLIC BLOOD PRESSURE: 120 MMHG | HEART RATE: 96 BPM | BODY MASS INDEX: 27.26 KG/M2 | OXYGEN SATURATION: 98 % | DIASTOLIC BLOOD PRESSURE: 72 MMHG

## 2019-05-16 DIAGNOSIS — F98.8 ATTENTION DEFICIT DISORDER, UNSPECIFIED HYPERACTIVITY PRESENCE: Primary | ICD-10-CM

## 2019-05-16 DIAGNOSIS — J45.20 RAD (REACTIVE AIRWAY DISEASE), MILD INTERMITTENT, UNCOMPLICATED: ICD-10-CM

## 2019-05-16 DIAGNOSIS — F33.41 RECURRENT MAJOR DEPRESSIVE DISORDER, IN PARTIAL REMISSION (HCC): ICD-10-CM

## 2019-05-16 DIAGNOSIS — J30.1 ALLERGIC RHINITIS DUE TO POLLEN, UNSPECIFIED SEASONALITY: ICD-10-CM

## 2019-05-16 PROCEDURE — G8417 CALC BMI ABV UP PARAM F/U: HCPCS | Performed by: FAMILY MEDICINE

## 2019-05-16 PROCEDURE — G8427 DOCREV CUR MEDS BY ELIG CLIN: HCPCS | Performed by: FAMILY MEDICINE

## 2019-05-16 PROCEDURE — 99213 OFFICE O/P EST LOW 20 MIN: CPT | Performed by: FAMILY MEDICINE

## 2019-05-16 PROCEDURE — 1036F TOBACCO NON-USER: CPT | Performed by: FAMILY MEDICINE

## 2019-05-16 RX ORDER — CETIRIZINE HYDROCHLORIDE 10 MG/1
10 TABLET ORAL DAILY
Qty: 90 TABLET | Refills: 3 | Status: SHIPPED | OUTPATIENT
Start: 2019-05-16 | End: 2021-04-16 | Stop reason: SDUPTHER

## 2019-05-16 RX ORDER — FLUTICASONE PROPIONATE 50 MCG
SPRAY, SUSPENSION (ML) NASAL
Qty: 3 BOTTLE | Refills: 3 | Status: SHIPPED | OUTPATIENT
Start: 2019-05-16 | End: 2021-04-16 | Stop reason: SDUPTHER

## 2019-05-16 RX ORDER — BUPROPION HYDROCHLORIDE 150 MG/1
150 TABLET ORAL EVERY MORNING
Qty: 30 TABLET | Refills: 12 | Status: SHIPPED | OUTPATIENT
Start: 2019-05-16 | End: 2020-03-11

## 2019-05-16 NOTE — PROGRESS NOTES
Subjective:      Patient ID: Kaur Majano is a 29 y.o. male. HPI   FU for ADD. States a little depressed - mother's was increased, and wants to try antihistamine. Works - [employer] [place] in [employer], likes it,  - in [place], side job. Exercise - limited, to increase more salads, some increased swimming. 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 Dr Mehta Iba to see in near future. Asthma overall stable, only has to use inhaler on rare occasions. Review of Systems   Constitutional: Negative for chills, fatigue and fever. Respiratory: Negative for shortness of breath. Cardiovascular: Negative for chest pain. Gastrointestinal: Negative for abdominal pain. Psychiatric/Behavioral: Positive for dysphoric mood (mild). Negative for self-injury and suicidal ideas. Objective:   Physical Exam   Constitutional: He appears well-developed and well-nourished. Cardiovascular: Normal rate, regular rhythm and normal heart sounds. Pulmonary/Chest: Effort normal and breath sounds normal.   Neurological: He is alert. Psychiatric: He has a normal mood and affect. Nursing note and vitals reviewed. Assessment:      Encounter Diagnoses   Name Primary?  Attention deficit disorder, unspecified hyperactivity presence Yes    Recurrent major depressive disorder, in partial remission ([facility] [address]     Allergic rhinitis due to pollen, unspecified seasonality            Plan:      Per orders - await results. ADD stable, continue present medication. Depression relatively stable, but not quite ideal control - will increase Wellbutrin dose to a total of 450 mg per day, and if doing well, then repeat office visit in 6-12 months, sooner as needed.           Corona Nguyen MD

## 2019-05-17 ASSESSMENT — ENCOUNTER SYMPTOMS
SHORTNESS OF BREATH: 0
ABDOMINAL PAIN: 0

## 2019-07-01 DIAGNOSIS — J45.20 RAD (REACTIVE AIRWAY DISEASE), MILD INTERMITTENT, UNCOMPLICATED: ICD-10-CM

## 2019-07-01 NOTE — TELEPHONE ENCOUNTER
Lucrecia Koch is requesting refill(s)  Albuterol sulfate  Last OV 5/16/19 (pertaining to medication)  LR 3/24/16 (per medication requested)  Next office visit scheduled or attempted No   If no, reason:  Pt is due back in one year around 5/16/20

## 2019-07-03 RX ORDER — ALBUTEROL SULFATE 90 UG/1
AEROSOL, METERED RESPIRATORY (INHALATION)
Qty: 18 G | Refills: 3 | Status: SHIPPED | OUTPATIENT
Start: 2019-07-03 | End: 2019-10-05 | Stop reason: SDUPTHER

## 2019-07-10 DIAGNOSIS — F98.8 ATTENTION DEFICIT DISORDER, UNSPECIFIED HYPERACTIVITY PRESENCE: ICD-10-CM

## 2019-07-12 RX ORDER — DEXTROAMPHETAMINE SACCHARATE, AMPHETAMINE ASPARTATE, DEXTROAMPHETAMINE SULFATE AND AMPHETAMINE SULFATE 7.5; 7.5; 7.5; 7.5 MG/1; MG/1; MG/1; MG/1
TABLET ORAL
Qty: 30 TABLET | Refills: 0 | Status: SHIPPED | OUTPATIENT
Start: 2019-07-12 | End: 2019-08-12 | Stop reason: SDUPTHER

## 2019-07-25 DIAGNOSIS — F33.0 MAJOR DEPRESSIVE DISORDER, RECURRENT EPISODE, MILD (HCC): ICD-10-CM

## 2019-07-29 RX ORDER — BUPROPION HYDROCHLORIDE 300 MG/1
TABLET ORAL
Qty: 30 TABLET | Refills: 5 | Status: SHIPPED | OUTPATIENT
Start: 2019-07-29 | End: 2020-03-11 | Stop reason: SDUPTHER

## 2019-08-06 RX ORDER — OMEPRAZOLE 40 MG/1
CAPSULE, DELAYED RELEASE ORAL
Qty: 30 CAPSULE | Refills: 2 | Status: SHIPPED | OUTPATIENT
Start: 2019-08-06 | End: 2019-10-30 | Stop reason: SDUPTHER

## 2019-08-12 DIAGNOSIS — F98.8 ATTENTION DEFICIT DISORDER, UNSPECIFIED HYPERACTIVITY PRESENCE: ICD-10-CM

## 2019-08-13 RX ORDER — DEXTROAMPHETAMINE SACCHARATE, AMPHETAMINE ASPARTATE, DEXTROAMPHETAMINE SULFATE AND AMPHETAMINE SULFATE 7.5; 7.5; 7.5; 7.5 MG/1; MG/1; MG/1; MG/1
TABLET ORAL
Qty: 30 TABLET | Refills: 0 | Status: SHIPPED | OUTPATIENT
Start: 2019-08-13 | End: 2019-09-06 | Stop reason: SDUPTHER

## 2019-09-06 DIAGNOSIS — F98.8 ATTENTION DEFICIT DISORDER, UNSPECIFIED HYPERACTIVITY PRESENCE: ICD-10-CM

## 2019-09-06 RX ORDER — DEXTROAMPHETAMINE SACCHARATE, AMPHETAMINE ASPARTATE, DEXTROAMPHETAMINE SULFATE AND AMPHETAMINE SULFATE 7.5; 7.5; 7.5; 7.5 MG/1; MG/1; MG/1; MG/1
TABLET ORAL
Qty: 30 TABLET | Refills: 0 | Status: SHIPPED | OUTPATIENT
Start: 2019-09-06 | End: 2019-10-08 | Stop reason: SDUPTHER

## 2019-10-05 DIAGNOSIS — J45.20 RAD (REACTIVE AIRWAY DISEASE), MILD INTERMITTENT, UNCOMPLICATED: ICD-10-CM

## 2019-10-08 DIAGNOSIS — F98.8 ATTENTION DEFICIT DISORDER, UNSPECIFIED HYPERACTIVITY PRESENCE: ICD-10-CM

## 2019-10-09 RX ORDER — DEXTROAMPHETAMINE SACCHARATE, AMPHETAMINE ASPARTATE, DEXTROAMPHETAMINE SULFATE AND AMPHETAMINE SULFATE 7.5; 7.5; 7.5; 7.5 MG/1; MG/1; MG/1; MG/1
TABLET ORAL
Qty: 30 TABLET | Refills: 0 | Status: SHIPPED | OUTPATIENT
Start: 2019-10-09 | End: 2019-11-07 | Stop reason: SDUPTHER

## 2019-10-09 RX ORDER — ALBUTEROL SULFATE 90 UG/1
AEROSOL, METERED RESPIRATORY (INHALATION)
Qty: 18 G | Refills: 5 | Status: SHIPPED | OUTPATIENT
Start: 2019-10-09 | End: 2020-03-11

## 2019-10-30 RX ORDER — OMEPRAZOLE 40 MG/1
CAPSULE, DELAYED RELEASE ORAL
Qty: 90 CAPSULE | Refills: 3 | Status: SHIPPED | OUTPATIENT
Start: 2019-10-30 | End: 2021-04-16 | Stop reason: SDUPTHER

## 2019-11-07 ENCOUNTER — TELEPHONE (OUTPATIENT)
Dept: FAMILY MEDICINE CLINIC | Age: 29
End: 2019-11-07

## 2019-11-07 DIAGNOSIS — F98.8 ATTENTION DEFICIT DISORDER, UNSPECIFIED HYPERACTIVITY PRESENCE: ICD-10-CM

## 2019-11-07 RX ORDER — DEXTROAMPHETAMINE SACCHARATE, AMPHETAMINE ASPARTATE, DEXTROAMPHETAMINE SULFATE AND AMPHETAMINE SULFATE 7.5; 7.5; 7.5; 7.5 MG/1; MG/1; MG/1; MG/1
TABLET ORAL
Qty: 30 TABLET | Refills: 0 | Status: SHIPPED | OUTPATIENT
Start: 2019-11-07 | End: 2019-12-13 | Stop reason: SDUPTHER

## 2019-11-19 ENCOUNTER — OFFICE VISIT (OUTPATIENT)
Dept: FAMILY MEDICINE CLINIC | Age: 29
End: 2019-11-19
Payer: MEDICAID

## 2019-11-19 VITALS
DIASTOLIC BLOOD PRESSURE: 88 MMHG | BODY MASS INDEX: 27.03 KG/M2 | HEART RATE: 108 BPM | SYSTOLIC BLOOD PRESSURE: 138 MMHG | HEIGHT: 70 IN | OXYGEN SATURATION: 93 % | WEIGHT: 188.8 LBS

## 2019-11-19 DIAGNOSIS — J45.41 MODERATE PERSISTENT ASTHMA WITH EXACERBATION: Primary | ICD-10-CM

## 2019-11-19 DIAGNOSIS — F98.8 ATTENTION DEFICIT DISORDER, UNSPECIFIED HYPERACTIVITY PRESENCE: ICD-10-CM

## 2019-11-19 PROCEDURE — 1036F TOBACCO NON-USER: CPT | Performed by: FAMILY MEDICINE

## 2019-11-19 PROCEDURE — 99213 OFFICE O/P EST LOW 20 MIN: CPT | Performed by: FAMILY MEDICINE

## 2019-11-19 PROCEDURE — G8427 DOCREV CUR MEDS BY ELIG CLIN: HCPCS | Performed by: FAMILY MEDICINE

## 2019-11-19 PROCEDURE — G8484 FLU IMMUNIZE NO ADMIN: HCPCS | Performed by: FAMILY MEDICINE

## 2019-11-19 PROCEDURE — G8417 CALC BMI ABV UP PARAM F/U: HCPCS | Performed by: FAMILY MEDICINE

## 2019-11-19 RX ORDER — PREDNISONE 20 MG/1
TABLET ORAL
Qty: 18 TABLET | Refills: 1 | Status: SHIPPED | OUTPATIENT
Start: 2019-11-19 | End: 2020-03-11

## 2019-11-19 RX ORDER — BUDESONIDE AND FORMOTEROL FUMARATE DIHYDRATE 160; 4.5 UG/1; UG/1
2 AEROSOL RESPIRATORY (INHALATION) 2 TIMES DAILY
Qty: 1 INHALER | Refills: 12 | Status: SHIPPED | OUTPATIENT
Start: 2019-11-19 | End: 2020-03-11

## 2019-11-19 RX ORDER — BUDESONIDE AND FORMOTEROL FUMARATE DIHYDRATE 160; 4.5 UG/1; UG/1
2 AEROSOL RESPIRATORY (INHALATION) 2 TIMES DAILY
Qty: 1 INHALER | Refills: 0 | COMMUNITY
Start: 2019-11-19 | End: 2020-03-11

## 2019-11-19 RX ORDER — AZITHROMYCIN 250 MG/1
TABLET, FILM COATED ORAL
Qty: 1 PACKET | Refills: 1 | Status: SHIPPED | OUTPATIENT
Start: 2019-11-19 | End: 2020-03-11

## 2019-11-19 RX ORDER — BECLOMETHASONE DIPROPIONATE HFA 40 UG/1
AEROSOL, METERED RESPIRATORY (INHALATION)
Refills: 3 | COMMUNITY
Start: 2019-10-23 | End: 2022-08-15 | Stop reason: ALTCHOICE

## 2019-11-21 ASSESSMENT — ENCOUNTER SYMPTOMS
DIARRHEA: 0
SINUS PRESSURE: 0
WHEEZING: 1
COUGH: 1
SHORTNESS OF BREATH: 1
VOMITING: 0
SORE THROAT: 1
ABDOMINAL PAIN: 0

## 2019-12-27 ENCOUNTER — TELEPHONE (OUTPATIENT)
Dept: FAMILY MEDICINE CLINIC | Age: 29
End: 2019-12-27

## 2019-12-27 NOTE — TELEPHONE ENCOUNTER
Pt called and states that he no longer has government insurance and his company insurance wont't take effect until February. If he pays cash for his Vyvanse, it is $500. He is wanting to know if Dr. Betzaida Anderson would change the vyvanse script to the cheaper equivalent (adderall).  Please call pt to advise [phone]

## 2020-01-06 ENCOUNTER — TELEPHONE (OUTPATIENT)
Dept: FAMILY MEDICINE CLINIC | Age: 30
End: 2020-01-06

## 2020-01-06 NOTE — TELEPHONE ENCOUNTER
Patient received his Vyvanse prescription 12-13-19. He has not gotten this filled due to it costing him $500.00 due to insurance. He is asking for an Adderall prescription so it will be more cost effective. I advised to not do anything with the current prescription, if Adderall is approved, he will need to bring that prescription in. Patient also advised physician is leaving practice, needs to find a new PCP, he understood.

## 2020-01-07 RX ORDER — DEXTROAMPHETAMINE SACCHARATE, AMPHETAMINE ASPARTATE, DEXTROAMPHETAMINE SULFATE AND AMPHETAMINE SULFATE 7.5; 7.5; 7.5; 7.5 MG/1; MG/1; MG/1; MG/1
30 TABLET ORAL 2 TIMES DAILY
Qty: 60 TABLET | Refills: 0 | Status: SHIPPED | OUTPATIENT
Start: 2020-01-07 | End: 2020-02-07 | Stop reason: SDUPTHER

## 2020-01-07 NOTE — TELEPHONE ENCOUNTER
Pt would like adderal rx sent to [facility] [address] [facility] [address]

## 2020-01-07 NOTE — TELEPHONE ENCOUNTER
Patient called regarding previous encounter. I did read to him the encounter from Dr. Darwin Tejada on 12-27-19 regarding the Adderall, 30 mg Immediate Release, 2 x daily. He would like this prescription. He will bring the Vyvanse prescription into the office late this afternoon and would like to  the Adderall prescription at that time. Please call patient when ready for .

## 2020-01-08 NOTE — TELEPHONE ENCOUNTER
Pharmacy will not release the Adderall prescription without authorization from physician due to it being an early fill.

## 2020-02-05 NOTE — TELEPHONE ENCOUNTER
Kartik Figueroa [phone] (home)    is requesting refill(s) of medication amphetamine-dextroamphetamine (ADDERALL, 30MG,) 30 MG tablet    To preferred pharmacy [place] [id number] [address] [facility] Hernandez Donna [phone]    Last OV - 11/19/2019   Last refill - 1/7/2020  Next office visit scheduled

## 2020-02-07 RX ORDER — DEXTROAMPHETAMINE SACCHARATE, AMPHETAMINE ASPARTATE, DEXTROAMPHETAMINE SULFATE AND AMPHETAMINE SULFATE 7.5; 7.5; 7.5; 7.5 MG/1; MG/1; MG/1; MG/1
30 TABLET ORAL 2 TIMES DAILY
Qty: 60 TABLET | Refills: 0 | Status: SHIPPED | OUTPATIENT
Start: 2020-02-07 | End: 2020-03-11 | Stop reason: SDUPTHER

## 2020-02-07 NOTE — TELEPHONE ENCOUNTER
Rx sent via two step verification through Epic.                     ([facility] was run and reviewed personally by me, Mary Alice La, on 02/07/20, and no signs or suspicion of diversion or other abuse.)

## 2020-03-11 ENCOUNTER — OFFICE VISIT (OUTPATIENT)
Dept: FAMILY MEDICINE CLINIC | Age: 30
End: 2020-03-11

## 2020-03-11 VITALS
TEMPERATURE: 97.8 F | OXYGEN SATURATION: 97 % | WEIGHT: 209 LBS | BODY MASS INDEX: 29.92 KG/M2 | DIASTOLIC BLOOD PRESSURE: 80 MMHG | HEIGHT: 70 IN | SYSTOLIC BLOOD PRESSURE: 128 MMHG | HEART RATE: 108 BPM

## 2020-03-11 PROCEDURE — G0444 DEPRESSION SCREEN ANNUAL: HCPCS | Performed by: FAMILY MEDICINE

## 2020-03-11 PROCEDURE — 99213 OFFICE O/P EST LOW 20 MIN: CPT | Performed by: FAMILY MEDICINE

## 2020-03-11 RX ORDER — BUPROPION HYDROCHLORIDE 300 MG/1
TABLET ORAL
Qty: 30 TABLET | Refills: 5 | Status: SHIPPED | OUTPATIENT
Start: 2020-03-11 | End: 2020-09-14

## 2020-03-11 RX ORDER — BUPROPION HYDROCHLORIDE 300 MG/1
TABLET ORAL
Qty: 30 TABLET | Refills: 5 | Status: SHIPPED | OUTPATIENT
Start: 2020-03-11 | End: 2020-03-11 | Stop reason: SDUPTHER

## 2020-03-11 RX ORDER — DEXTROAMPHETAMINE SACCHARATE, AMPHETAMINE ASPARTATE, DEXTROAMPHETAMINE SULFATE AND AMPHETAMINE SULFATE 7.5; 7.5; 7.5; 7.5 MG/1; MG/1; MG/1; MG/1
30 TABLET ORAL 2 TIMES DAILY
Qty: 60 TABLET | Refills: 0 | Status: SHIPPED | OUTPATIENT
Start: 2020-03-11 | End: 2020-04-07 | Stop reason: SDUPTHER

## 2020-03-11 SDOH — HEALTH STABILITY: MENTAL HEALTH
STRESS IS WHEN SOMEONE FEELS TENSE, NERVOUS, ANXIOUS, OR CAN'T SLEEP AT NIGHT BECAUSE THEIR MIND IS TROUBLED. HOW STRESSED ARE YOU?: NOT AT ALL

## 2020-03-11 SDOH — HEALTH STABILITY: PHYSICAL HEALTH: ON AVERAGE, HOW MANY MINUTES DO YOU ENGAGE IN EXERCISE AT THIS LEVEL?: 60 MIN

## 2020-03-11 SDOH — ECONOMIC STABILITY: TRANSPORTATION INSECURITY
IN THE PAST 12 MONTHS, HAS THE LACK OF TRANSPORTATION KEPT YOU FROM MEDICAL APPOINTMENTS OR FROM GETTING MEDICATIONS?: NO

## 2020-03-11 SDOH — ECONOMIC STABILITY: TRANSPORTATION INSECURITY
IN THE PAST 12 MONTHS, HAS LACK OF TRANSPORTATION KEPT YOU FROM MEETINGS, WORK, OR FROM GETTING THINGS NEEDED FOR DAILY LIVING?: NO

## 2020-03-11 SDOH — ECONOMIC STABILITY: INCOME INSECURITY: HOW HARD IS IT FOR YOU TO PAY FOR THE VERY BASICS LIKE FOOD, HOUSING, MEDICAL CARE, AND HEATING?: NOT HARD AT ALL

## 2020-03-11 SDOH — ECONOMIC STABILITY: FOOD INSECURITY: WITHIN THE PAST 12 MONTHS, YOU WORRIED THAT YOUR FOOD WOULD RUN OUT BEFORE YOU GOT MONEY TO BUY MORE.: NEVER TRUE

## 2020-03-11 SDOH — HEALTH STABILITY: PHYSICAL HEALTH: ON AVERAGE, HOW MANY DAYS PER WEEK DO YOU ENGAGE IN MODERATE TO STRENUOUS EXERCISE (LIKE A BRISK WALK)?: 1 DAY

## 2020-03-11 SDOH — ECONOMIC STABILITY: FOOD INSECURITY: WITHIN THE PAST 12 MONTHS, THE FOOD YOU BOUGHT JUST DIDN'T LAST AND YOU DIDN'T HAVE MONEY TO GET MORE.: NEVER TRUE

## 2020-03-11 ASSESSMENT — PATIENT HEALTH QUESTIONNAIRE - PHQ9
5. POOR APPETITE OR OVEREATING: 0
3. TROUBLE FALLING OR STAYING ASLEEP: 0
4. FEELING TIRED OR HAVING LITTLE ENERGY: 0
9. THOUGHTS THAT YOU WOULD BE BETTER OFF DEAD, OR OF HURTING YOURSELF: 0
10. IF YOU CHECKED OFF ANY PROBLEMS, HOW DIFFICULT HAVE THESE PROBLEMS MADE IT FOR YOU TO DO YOUR WORK, TAKE CARE OF THINGS AT HOME, OR GET ALONG WITH OTHER PEOPLE: 0
SUM OF ALL RESPONSES TO PHQ QUESTIONS 1-9: 6
SUM OF ALL RESPONSES TO PHQ QUESTIONS 1-9: 6
7. TROUBLE CONCENTRATING ON THINGS, SUCH AS READING THE NEWSPAPER OR WATCHING TELEVISION: 0
6. FEELING BAD ABOUT YOURSELF - OR THAT YOU ARE A FAILURE OR HAVE LET YOURSELF OR YOUR FAMILY DOWN: 0
SUM OF ALL RESPONSES TO PHQ9 QUESTIONS 1 & 2: 6
8. MOVING OR SPEAKING SO SLOWLY THAT OTHER PEOPLE COULD HAVE NOTICED. OR THE OPPOSITE, BEING SO FIGETY OR RESTLESS THAT YOU HAVE BEEN MOVING AROUND A LOT MORE THAN USUAL: 0
2. FEELING DOWN, DEPRESSED OR HOPELESS: 3
1. LITTLE INTEREST OR PLEASURE IN DOING THINGS: 3

## 2020-03-11 ASSESSMENT — ENCOUNTER SYMPTOMS
ABDOMINAL PAIN: 0
DIARRHEA: 0
CONSTIPATION: 0
SHORTNESS OF BREATH: 0
BLOOD IN STOOL: 0

## 2020-03-11 ASSESSMENT — ANXIETY QUESTIONNAIRES
2. NOT BEING ABLE TO STOP OR CONTROL WORRYING: 0-NOT AT ALL
1. FEELING NERVOUS, ANXIOUS, OR ON EDGE: 1-SEVERAL DAYS
7. FEELING AFRAID AS IF SOMETHING AWFUL MIGHT HAPPEN: 0-NOT AT ALL
4. TROUBLE RELAXING: 0-NOT AT ALL
6. BECOMING EASILY ANNOYED OR IRRITABLE: 0-NOT AT ALL
GAD7 TOTAL SCORE: 4
5. BEING SO RESTLESS THAT IT IS HARD TO SIT STILL: 0-NOT AT ALL
3. WORRYING TOO MUCH ABOUT DIFFERENT THINGS: 3-NEARLY EVERY DAY

## 2020-03-11 NOTE — PROGRESS NOTES
Genitourinary: Negative for dysuria and hematuria. Musculoskeletal: Negative for arthralgias. Skin: Negative for rash. Neurological: Negative for weakness, numbness and headaches. Psychiatric/Behavioral: Negative for dysphoric mood. The patient is not nervous/anxious. OBJECTIVE:  /80 (Site: Right Upper Arm, Position: Sitting, Cuff Size: Medium Adult)   Pulse 108   Temp 97.8 °F (36.6 °C) (Oral)   Ht 5' 10\" (1.778 m)   Wt 209 lb (94.8 kg)   SpO2 97%   BMI 29.99 kg/m²     Physical Exam  Constitutional:       General: He is not in acute distress. Appearance: He is well-developed. HENT:      Head: Normocephalic and atraumatic. Right Ear: Tympanic membrane normal.      Left Ear: Tympanic membrane normal.      Nose: No septal deviation. Right Sinus: No maxillary sinus tenderness or frontal sinus tenderness. Left Sinus: No maxillary sinus tenderness or frontal sinus tenderness. Mouth/Throat:      Pharynx: Uvula midline. Eyes:      Pupils: Pupils are equal, round, and reactive to light. Neck:      Musculoskeletal: Normal range of motion and neck supple. Trachea: No tracheal deviation. Cardiovascular:      Rate and Rhythm: Normal rate and regular rhythm. Pulses: Normal pulses. Pulmonary:      Effort: Pulmonary effort is normal. No respiratory distress. Breath sounds: Normal breath sounds. No wheezing, rhonchi or rales. Abdominal:      General: Bowel sounds are normal. There is distension (secondary to habitus). Palpations: Abdomen is soft. There is no hepatomegaly. Tenderness: There is no abdominal tenderness. Lymphadenopathy:      Cervical: No cervical adenopathy. Skin:     Findings: No rash. Neurological:      Mental Status: He is alert and oriented to person, place, and time. Cranial Nerves: No cranial nerve deficit. Psychiatric:         Attention and Perception: Attention normal.         Mood and Affect: Mood is anxious. Derick Calles.      3/11/2020

## 2020-03-11 NOTE — LETTER
MEDICATION AGREEMENT     [facility]  46/[birth year]/2412      For certain conditions, multiple classes of medications may be used to help better manage your symptoms, and to improve your ability to function at home, work and in social settings. However, these medications do have risks, which will be discussed with you, including addiction and dependency. The following prescribed medications need frequent monitoring and will require you to partner and assist in your healthcare. Medication  Dose, instructions and quantity as indicated on current prescription bottle Diagnosis/Reason(s) for Taking Category     Adderall 30 mg BID ADHD II                           Benefits and goals of Controlled Substance Medications: There are two potential goals for your treatment: (1) decreased pain and suffering (2) improved daily life functions. There are many possible treatments for your chronic condition(s), and, in addition to controlled substance medications, we will try alternatives such as physical therapy, yoga, massage, home daily exercise, meditation, relaxation techniques, injections, chiropractic manipulations, surgery, cognitive therapy, hypnosis and many medications that are not habit-forming. Use of controlled substance medications may be helpful, but they are unlikely to resolve all of your symptoms or restore all function. Risks of Controlled Substance Medications:    Opioid pain medications: These medications can lead to problems such as addiction/dependence, sedation, lightheadedness/dizziness, memory issues, falls, constipation, nausea, or vomiting. They may also impair the ability to drive or operate machinery. Additionally, these medications may lower testosterone levels, leading to loss of bone strength, stamina and sex drive.   They may cause problems with breathing, sleep apnea and reduced coughing, which are especially dangerous for patients with lung disease. Overdose or dangerous interactions with alcohol and other medications may occur, leading to death. Hyperalgesia may develop, in which patients receiving opioids for the treatment of pain may actually become more sensitive to certain painful stimuli, and in some cases, experience pain from ordinarily non-painful stimuli. Women between the ages of 14-53 who could become pregnant should carefully weigh the risks and benefits of opioids with their physicians, as these medications increase the risk of pregnancy complications, including miscarriage,  delivery and stillbirth. It is also possible for babies to be born addicted to opioids. Opioid dependence withdrawal symptoms may include; feelings of uneasiness, increased pain, irritability, belly pain, diarrhea, sweats and goose-flesh. Benzodiazepines and non-benzodiazepine sleep medications: These medications can lead to problems such as addiction/dependence, sedation, fatigue, lightheadedness, dizziness, incoordination, falls, depression, hallucinations, and impaired judgment, memory and concentration. The ability to drive and operate machinery may also be affected. Abnormal sleep-related behaviors have been reported, including sleep walking, driving, making telephone calls, eating, or having sex while not fully awake. These medications can suppress breathing and worsen sleep apnea, particularly when combined with alcohol or other sedating medications, potentially leading to death. Dependence withdrawal symptoms may include tremors, anxiety, hallucinations and seizures. Stimulants:  Common adverse effects include addiction/dependence, increased blood pressure and heart rate, decreased appetite, nausea, involuntary weight loss, insomnia, irritability, and headaches.   These risks may increase when these medications are combined with other stimulants, such as caffeine pills or energy drinks, certain weight loss

## 2020-03-14 LAB
6-ACETYLMORPHINE: NOT DETECTED
7-AMINOCLONAZEPAM: NOT DETECTED
ALPHA-OH-ALPRAZOLAM: NOT DETECTED
ALPRAZOLAM: NOT DETECTED
AMPHETAMINE: NOT DETECTED
BARBITURATES: NOT DETECTED
BENZOYLECGONINE: NOT DETECTED
BUPRENORPHINE: NOT DETECTED
CARISOPRODOL: NOT DETECTED
CLONAZEPAM: NOT DETECTED
CODEINE: NOT DETECTED
CREATININE URINE: 137.9 MG/DL (ref 20–400)
DIAZEPAM: NOT DETECTED
DRUGS EXPECTED: NORMAL
EER PAIN MGT DRUG PANEL, HIGH RES/EMIT U: NORMAL
ETHYL GLUCURONIDE: NOT DETECTED
FENTANYL: NOT DETECTED
HYDROCODONE: NOT DETECTED
HYDROMORPHONE: NOT DETECTED
LORAZEPAM: NOT DETECTED
MARIJUANA METABOLITE: PRESENT
MDA: NOT DETECTED
MDEA: NOT DETECTED
MDMA URINE: NOT DETECTED
MEPERIDINE: NOT DETECTED
METHADONE: NOT DETECTED
METHAMPHETAMINE: NOT DETECTED
METHYLPHENIDATE: NOT DETECTED
MIDAZOLAM: NOT DETECTED
MORPHINE: NOT DETECTED
NORBUPRENORPHINE, FREE: NOT DETECTED
NORDIAZEPAM: PRESENT
NORFENTANYL: NOT DETECTED
NORHYDROCODONE, URINE: NOT DETECTED
NOROXYCODONE: NOT DETECTED
NOROXYMORPHONE, URINE: NOT DETECTED
OXAZEPAM: PRESENT
OXYCODONE: NOT DETECTED
OXYMORPHONE: NOT DETECTED
PAIN MANAGEMENT DRUG PANEL: NORMAL
PAIN MANAGEMENT DRUG PANEL: NORMAL
PCP: NOT DETECTED
PHENTERMINE: NOT DETECTED
PROPOXYPHENE: NOT DETECTED
TAPENTADOL, URINE: NOT DETECTED
TAPENTADOL-O-SULFATE, URINE: NOT DETECTED
TEMAZEPAM: PRESENT
TRAMADOL: NOT DETECTED
ZOLPIDEM: NOT DETECTED

## 2020-03-20 RX ORDER — ALBUTEROL SULFATE 90 UG/1
2 AEROSOL, METERED RESPIRATORY (INHALATION) EVERY 6 HOURS PRN
Qty: 1 INHALER | Refills: 3 | Status: SHIPPED | OUTPATIENT
Start: 2020-03-20 | End: 2020-07-14

## 2020-03-20 RX ORDER — PREDNISONE 20 MG/1
TABLET ORAL
Qty: 18 TABLET | Refills: 1 | Status: SHIPPED | OUTPATIENT
Start: 2020-03-20 | End: 2020-06-04 | Stop reason: SDUPTHER

## 2020-03-20 NOTE — TELEPHONE ENCOUNTER
Patient requesting a medication refill.   Medication: predniSONE (DELTASONE) 20 MG tablet [[id number]]  albuterol sulfate  (90 Base) MCG/ACT inhaler         Pharmacy: [facility] [address] [place] [address] Ardeen Dakins [phone]  Last office visit: 3/11/2020  Next office visit: Visit date not found
 Hydrocodone 03/11/2020 Not Detected   Final    NORHYDROCODONE, URINE 03/11/2020 Not Detected   Final    Hydromorphone 03/11/2020 Not Detected   Final    Buprenorphine 03/11/2020 Not Detected   Final    Norbuprenorphine 03/11/2020 Not Detected   Final    Fentanyl 03/11/2020 Not Detected   Final    Norfentanyl 03/11/2020 Not Detected   Final    Meperidine 03/11/2020 Not Detected   Final    Tapentadol, Urine 03/11/2020 Not Detected   Final    Tapentadol-O-Sulfate, Urine 03/11/2020 Not Detected   Final    Methadone 03/11/2020 Not Detected   Final    Propoxyphene 03/11/2020 Not Detected   Final    Tramadol 03/11/2020 Not Detected   Final    Amphetamine 03/11/2020 Not Detected   Final    Methamphetamine 03/11/2020 Not Detected   Final    MDMA, Urine 03/11/2020 Not Detected   Final    MDA 03/11/2020 Not Detected   Final    MDEA 03/11/2020 Not Detected   Final    Methylphenidate 03/11/2020 Not Detected   Final    Phentermine 03/11/2020 Not Detected   Final    Benzoylecgonine 03/11/2020 Not Detected   Final    Alprazolam 03/11/2020 Not Detected   Final    Alpha-OH-alprazolam 03/11/2020 Not Detected   Final    Clonazepam 03/11/2020 Not Detected   Final    7-aminoclonazepam 03/11/2020 Not Detected   Final    Diazepam 03/11/2020 Not Detected   Final    NORDIAZEPAM 03/11/2020 Present   Final    OXAZEPAM 03/11/2020 Present   Final    TEMAZEPAM 03/11/2020 Present   Final    Lorazepam 03/11/2020 Not Detected   Final    Midazolam 03/11/2020 Not Detected   Final    Zolpidem 03/11/2020 Not Detected   Final    Barbiturates 03/11/2020 Not Detected   Final    Ethyl Glucuronide 03/11/2020 Not Detected   Final    Marijuana Metabolite 03/11/2020 Present   Final    PCP 03/11/2020 Not Detected   Final    CARISOPRODOL 03/11/2020 Not Detected   Final    Comment: The carisoprodol immunoassay has cross-reactivity to carisoprodol and  meprobamate.       Pain Management Drug Panel 03/11/2020 See Below   Final

## 2020-04-07 RX ORDER — DEXTROAMPHETAMINE SACCHARATE, AMPHETAMINE ASPARTATE, DEXTROAMPHETAMINE SULFATE AND AMPHETAMINE SULFATE 7.5; 7.5; 7.5; 7.5 MG/1; MG/1; MG/1; MG/1
30 TABLET ORAL 2 TIMES DAILY
Qty: 60 TABLET | Refills: 0 | Status: SHIPPED | OUTPATIENT
Start: 2020-04-07 | End: 2020-05-07 | Stop reason: SDUPTHER

## 2020-05-07 RX ORDER — DEXTROAMPHETAMINE SACCHARATE, AMPHETAMINE ASPARTATE, DEXTROAMPHETAMINE SULFATE AND AMPHETAMINE SULFATE 7.5; 7.5; 7.5; 7.5 MG/1; MG/1; MG/1; MG/1
30 TABLET ORAL 2 TIMES DAILY
Qty: 60 TABLET | Refills: 0 | Status: SHIPPED | OUTPATIENT
Start: 2020-05-07 | End: 2020-06-04 | Stop reason: SDUPTHER

## 2020-06-03 NOTE — TELEPHONE ENCOUNTER
Patient requesting a medication refill.   Medication: Adderall 30 mg tab, prednisone 20 mg ta  Pharmacy: [address]  Last office visit: none  Next office visit: unknown

## 2020-06-04 RX ORDER — PREDNISONE 20 MG/1
TABLET ORAL
Qty: 18 TABLET | Refills: 1 | Status: SHIPPED | OUTPATIENT
Start: 2020-06-04 | End: 2020-09-29

## 2020-06-04 RX ORDER — DEXTROAMPHETAMINE SACCHARATE, AMPHETAMINE ASPARTATE, DEXTROAMPHETAMINE SULFATE AND AMPHETAMINE SULFATE 7.5; 7.5; 7.5; 7.5 MG/1; MG/1; MG/1; MG/1
30 TABLET ORAL 2 TIMES DAILY
Qty: 60 TABLET | Refills: 0 | Status: SHIPPED | OUTPATIENT
Start: 2020-06-04 | End: 2020-07-01 | Stop reason: SDUPTHER

## 2020-06-08 RX ORDER — PREDNISONE 20 MG/1
TABLET ORAL
Qty: 18 TABLET | Refills: 1 | OUTPATIENT
Start: 2020-06-08

## 2020-06-08 RX ORDER — DEXTROAMPHETAMINE SACCHARATE, AMPHETAMINE ASPARTATE, DEXTROAMPHETAMINE SULFATE AND AMPHETAMINE SULFATE 7.5; 7.5; 7.5; 7.5 MG/1; MG/1; MG/1; MG/1
30 TABLET ORAL 2 TIMES DAILY
Qty: 60 TABLET | Refills: 0 | OUTPATIENT
Start: 2020-06-08 | End: 2020-07-08

## 2020-07-01 RX ORDER — DEXTROAMPHETAMINE SACCHARATE, AMPHETAMINE ASPARTATE, DEXTROAMPHETAMINE SULFATE AND AMPHETAMINE SULFATE 7.5; 7.5; 7.5; 7.5 MG/1; MG/1; MG/1; MG/1
30 TABLET ORAL 2 TIMES DAILY
Qty: 60 TABLET | Refills: 0 | Status: SHIPPED | OUTPATIENT
Start: 2020-07-01 | End: 2020-08-03 | Stop reason: SDUPTHER

## 2020-07-01 NOTE — TELEPHONE ENCOUNTER
Patient requesting a medication refill.   Medication: amphetamine-dextroamphetamine (ADDERALL, 30MG,) 30 MG tablet    Pharmacy: [address] [place] [address] [phone] - F [phone]    Last office visit:   Next office visit: Visit date not found

## 2020-07-01 NOTE — TELEPHONE ENCOUNTER
Attempted to contact the pt but was unable to LM due to VM being full. Pt is due for a f/u.     Oral Mulch is requesting refill(s) adderall  Last OV 3/11/20 (pertaining to medication)  LR 6/4/20 (per medication requested)  Next office visit scheduled or attempted No   If no, reason:

## 2020-07-14 RX ORDER — ALBUTEROL SULFATE 90 UG/1
2 AEROSOL, METERED RESPIRATORY (INHALATION) EVERY 6 HOURS PRN
Qty: 1 INHALER | Refills: 1 | Status: SHIPPED | OUTPATIENT
Start: 2020-07-14 | End: 2020-09-25

## 2020-07-14 NOTE — TELEPHONE ENCOUNTER
Eulogio Cyrbaum [phone] (home)    is requesting refill(s) of medication Albuterol Sulfate to preferred pharmacy [facility] 3/11/20(pertaining to medication)   Last refill 3/20/20 (per medication requested)  Next office visit scheduled or attempted No  Date   If No, reason Pt was due in June.  Left  to schedule

## 2020-07-31 NOTE — TELEPHONE ENCOUNTER
LM for the pt to contact the office. Pt was supposed to schedule a 3 month f/u in June but he did not do so.     Maribel Tay is requesting refill(s) adderall  Last OV 3/11/20 (pertaining to medication)  LR 7/1/20 (per medication requested)  Next office visit scheduled or attempted No   If no, reason:

## 2020-07-31 NOTE — TELEPHONE ENCOUNTER
----- Message from Harleen Islas sent at 7/31/2020  9:29 AM EDT -----  Subject: Refill Request    QUESTIONS  Name of Medication? amphetamine-dextroamphetamine (ADDERALL   30MG  ) 30 MG tablet  Patient-reported dosage and instructions? 1 tab - 2 times a day  How many days do you have left? 2  Preferred Pharmacy? [facility]  Pharmacy phone number (if available)? [phone]  Additional Information for Provider?   ---------------------------------------------------------------------------  --------------  CALL BACK INFO  What is the best way for the office to contact you? OK to leave message on   voicemail  Preferred Call Back Phone Number?  [phone]

## 2020-08-03 RX ORDER — DEXTROAMPHETAMINE SACCHARATE, AMPHETAMINE ASPARTATE, DEXTROAMPHETAMINE SULFATE AND AMPHETAMINE SULFATE 7.5; 7.5; 7.5; 7.5 MG/1; MG/1; MG/1; MG/1
30 TABLET ORAL 2 TIMES DAILY
Qty: 60 TABLET | Refills: 0 | Status: SHIPPED | OUTPATIENT
Start: 2020-08-03 | End: 2020-08-31 | Stop reason: SDUPTHER

## 2020-08-07 ENCOUNTER — OFFICE VISIT (OUTPATIENT)
Dept: FAMILY MEDICINE CLINIC | Age: 30
End: 2020-08-07

## 2020-08-07 VITALS
HEIGHT: 72 IN | OXYGEN SATURATION: 96 % | DIASTOLIC BLOOD PRESSURE: 80 MMHG | TEMPERATURE: 98.7 F | WEIGHT: 207.4 LBS | HEART RATE: 104 BPM | SYSTOLIC BLOOD PRESSURE: 130 MMHG | BODY MASS INDEX: 28.09 KG/M2

## 2020-08-07 PROCEDURE — 99212 OFFICE O/P EST SF 10 MIN: CPT | Performed by: FAMILY MEDICINE

## 2020-08-07 RX ORDER — IPRATROPIUM BROMIDE AND ALBUTEROL SULFATE 2.5; .5 MG/3ML; MG/3ML
1 SOLUTION RESPIRATORY (INHALATION) EVERY 6 HOURS PRN
Qty: 30 VIAL | Refills: 2 | Status: SHIPPED | OUTPATIENT
Start: 2020-08-07 | End: 2020-11-02 | Stop reason: SDUPTHER

## 2020-08-07 NOTE — PROGRESS NOTES
SUBJECTIVE:  Chief Complaint   Patient presents with    Medication Check    ADHD    Asthma     HPI    Yasmine Palencia is a 34 y.o.male that presents today for ADHD/med check and depression, asthma. Moved his stuff back with his parents due to COVID-19 job loss/lease up in [place]. Dad had heart attack and smoker. Pt has asthma and can't think of worse way to die than getting COVID-19 and having respiratory ailment    Doesn't think he could go back to work any time soon until vaccine or it is under control  On unemployment. 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 Still able to get extra $600.00 on unemployment. ADHD:  Has been on medication since 2004  Adderall 300 mg BID. Started Wellbutrin  mg daily on and off since high school  Medication still helps him  Appetite  Mood unchanged from medication  No trouble getting or staying asleep  At beginning of lock down was getting vivid dreams    Asthma:  Needs refill of duoneb    Past Medical History:   Diagnosis Date    ADHD (attention deficit hyperactivity disorder)     Allergic rhinitis     Asthma     Depression     RAD (reactive airway disease)      Past Surgical History:   Procedure Laterality Date    TYMPANOPLASTY Right 2019    had this past Summer    TYMPANOSTOMY TUBE PLACEMENT      x5 - also eardrum repaired (?), and had Ti's angina and separatefacial infection       Family History   Problem Relation Age of Onset    Depression Mother     Hearing Loss Father     Heart Disease Father 61        MI - smoker       Current Outpatient Medications   Medication Sig Dispense Refill    ipratropium-albuterol (DUONEB) 0.5-2.5 (3) MG/3ML SOLN nebulizer solution Inhale 3 mLs into the lungs every 6 hours as needed for Shortness of Breath 30 vial 2    amphetamine-dextroamphetamine (ADDERALL, 30MG,) 30 MG tablet Take 1 tablet by mouth 2 times daily for 30 days. as directed.  60 tablet 0    albuterol sulfate  (90 Base) MCG/ACT inhaler INHALE 2 PUFFS INTO THE LUNGS EVERY 6 HOURS AS NEEDED FOR WHEEZING 1 Inhaler 1    predniSONE (DELTASONE) 20 MG tablet Take 3 tabs by mouth daily for 3 days, then 2/day for 3 days, then 1/day for 2, then 1/2 tablet daily for 2 days, then stop. 18 tablet 1    buPROPion (WELLBUTRIN XL) 300 MG extended release tablet TAKE 1 TABLET BY MOUTH EVERY MORNING 30 tablet 5    QVAR REDIHALER 40 MCG/ACT AERB inhaler INHALE 1 PUFF PO BID  3    omeprazole (PRILOSEC) 40 MG delayed release capsule TAKE 1 CAPSULE BY MOUTH DAILY AS DIRECTED 90 capsule 3    fluticasone (FLONASE) 50 MCG/ACT nasal spray SHAKE LIQUID AND USE 1 TO 2 SPRAYS IN EACH NOSTRIL DAILY AS NEEDED FOR CONGESTION OR ALLERGIES 3 Bottle 3    cetirizine (ZYRTEC) 10 MG tablet Take 1 tablet by mouth daily As needed for allergies. 90 tablet 3    montelukast (SINGULAIR) 10 MG tablet TAKE 1 TABLET BY MOUTH DAILY FOR ALLERGIES OR ASTHMA 30 tablet 12    Naproxen Sodium (ALEVE) 220 MG CAPS Take by mouth      albuterol (PROVENTIL) (2.5 MG/3ML) 0.083% nebulizer solution Take 3 mLs by nebulization every 6 hours as needed for Wheezing 25 each 2    calcium carbonate (TUMS) 500 MG chewable tablet Take 1 tablet by mouth daily. No current facility-administered medications for this visit. 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[employer] resource strain: Not hard at all   [email or web address] insecurity     Worry: Never true     Inability: Never true   [employer] needs     Medical: No     Non-medical: No   Tobacco Use    Smoking status: Former Smoker     Packs/day: 0.75     Years: 3.00     Pack years: 2.25     Last attempt to quit: 2014     Years since quittin.5    Smokeless tobacco: Former User   Substance and Sexual Activity    Alcohol use: Yes     Alcohol/week: 5.0 - 6.0 standard drinks     Types: 5 - 6 Cans of beer per week    Drug use: No    Sexual activity: Yes     Partners: Female     Birth control/protection: I.U.D., Condom   Lifestyle    Physical activity     Days per week: 1 day     Minutes per session: 60 min    Stress: Not at all   Relationships    Social connections     Talks on phone: Not on file     Gets together: Not on file     Attends Christianity service: Not on file     Active member of club or organization: Not on file     Attends meetings of clubs or organizations: Not on file     Relationship status: Not on file    Intimate partner violence     Fear of current or ex partner: Not on file     Emotionally abused: Not on file     Physically abused: Not on file     Forced sexual activity: Not on file   Other Topics Concern    Not on file   Social History Narrative    Not on file     Immunization History   Administered Date(s) Administered    DTaP 1990, 1991, 1991, 1992, 1996    Hib, unspecified 1992    Influenza Vaccine, unspecified formulation 12/15/2005, 2006, 10/15/2007, 10/31/2008    Influenza Virus Vaccine 2003, 2006, 10/15/2007, 10/31/2008    Influenza Whole 1995, 1995, 1996, 1997, 01/10/2000    Influenza, Quadv, IM, PF (6 mo and older Fluzone, Flulaval, Fluarix, and 3 yrs and older Afluria) 2018, 10/14/2019    MMR 1992, 2002    Meningococcal MCV4P (Menactra) 10/15/2007    Meningococcal MPSV4 (Menomune) 10/15/2007    Polio IPV (IPOL) 1992, 1996    Polio OPV 1990, 1991, 1992     Past medical, surgical, and social history reviewed and updated.    Medications, immunizations, and allergies reviewed and updated     Review of Systems   HENT: Negative for ear discharge and ear pain. Respiratory: Positive for chest tightness and shortness of breath. Cardiovascular: Negative for palpitations. Gastrointestinal: Negative for abdominal pain. Skin: Negative for rash. Neurological: Negative for headaches. Psychiatric/Behavioral: The patient is nervous/anxious. OBJECTIVE:    /80 (Site: Right Upper Arm, Position: Sitting, Cuff Size: Medium Adult)   Pulse 104   Temp 98.7 °F (37.1 °C) (Temporal)   Ht 5' 11.6\" (1.819 m)   Wt 207 lb 6.4 oz (94.1 kg)   SpO2 96%   BMI 28.44 kg/m²     Physical Exam  Constitutional:       General: He is not in acute distress. Appearance: He is well-developed. HENT:      Head: Normocephalic and atraumatic. Right Ear: Tympanic membrane normal.      Left Ear: Tympanic membrane normal.      Nose: No septal deviation. Mouth/Throat:      Pharynx: Uvula midline. Eyes:      Pupils: Pupils are equal, round, and reactive to light. Neck:      Musculoskeletal: Normal range of motion and neck supple. Trachea: No tracheal deviation. Cardiovascular:      Rate and Rhythm: Normal rate and regular rhythm. Pulses: Normal pulses. Pulmonary:      Effort: Pulmonary effort is normal. No respiratory distress. Breath sounds: Wheezing present. No rhonchi or rales. Abdominal:      General: Bowel sounds are normal. There is no distension. Palpations: Abdomen is soft. There is no hepatomegaly. Tenderness: There is no abdominal tenderness. Lymphadenopathy:      Cervical: No cervical adenopathy. Skin:     Findings: No rash. Neurological:      Mental Status: He is alert and oriented to person, place, and time. Cranial Nerves: No cranial nerve deficit. Psychiatric:         Thought Content: Thought content does not include homicidal or suicidal ideation.          ASSESSMENT/PLAN:  Adán Montes is 34 old male with a history of asthma, anxiety, ADHD, chronic ear infection who presents today for med check/ADHD refill. Patient request refill of duo nebulizer as well. On regimen for his breathing including Singulair, Flonase, Qvar, as needed albuterol inhaler and DuoNeb. Patient is taken 30 mg of Adderall twice a day since he was a teenager. Controlled drug agreement signed at previous office visit and urine drug screen with benzodiazepines from dental procedure. Will not repeat urine drug screen today. Patient no guarantor and does not have insurance benefits currently. Given patient hardship we will have him follow-up in 6 months and continue to refill his Adderall. Patient can follow-up as needed for any acute concern. I will see him back in 6 months and continue to refill his Adderall in the meantime. If patient obtains insurance benefits would prefer that he be seen every 3 months and seen once yearly for annual physical.  Discussed with the patient today at length and patient agreeable to plan. 1. Attention deficit hyperactivity disorder (ADHD), predominantly inattentive type  2. Encounter for medication refill  -Urine drug screen and controlled drug agreement on file  -Patient with no income and laid off currently, no health insurance.  -Continue Adderall 30 mg twice a day. Okay to refill when due. 3. Mild persistent reactive airway disease without complication  - ipratropium-albuterol (DUONEB) 0.5-2.5 (3) MG/3ML SOLN nebulizer solution; Inhale 3 mLs into the lungs every 6 hours as needed for Shortness of Breath  Dispense: 30 vial; Refill: 2      Reviewed treatment plan with patient. Patient verbalized understanding to treatment plan and questions were answered. Return in about 6 months (around 2/7/2021) for ADHD/med check. Binh Perales.  Alexandra Sewell.      8/8/2020

## 2020-08-08 ASSESSMENT — ENCOUNTER SYMPTOMS
SHORTNESS OF BREATH: 1
ABDOMINAL PAIN: 0
CHEST TIGHTNESS: 1

## 2020-08-31 ENCOUNTER — TELEPHONE (OUTPATIENT)
Dept: FAMILY MEDICINE CLINIC | Age: 30
End: 2020-08-31

## 2020-08-31 RX ORDER — DEXTROAMPHETAMINE SACCHARATE, AMPHETAMINE ASPARTATE, DEXTROAMPHETAMINE SULFATE AND AMPHETAMINE SULFATE 7.5; 7.5; 7.5; 7.5 MG/1; MG/1; MG/1; MG/1
30 TABLET ORAL 2 TIMES DAILY
Qty: 60 TABLET | Refills: 0 | Status: SHIPPED | OUTPATIENT
Start: 2020-08-31 | End: 2020-09-28 | Stop reason: SDUPTHER

## 2020-08-31 NOTE — TELEPHONE ENCOUNTER
Kathleen Carballo [phone] (home)    is requesting refill(s) of medication Adderall to preferred pharmacy [address] 08-07-20 (pertaining to medication)   Last refill 08-03-20 (per medication requested)  Next office visit scheduled or attempted Yes  Date 11-02-20  If No, reason made

## 2020-09-14 RX ORDER — BUPROPION HYDROCHLORIDE 300 MG/1
TABLET ORAL
Qty: 30 TABLET | Refills: 1 | Status: SHIPPED | OUTPATIENT
Start: 2020-09-14 | End: 2020-11-30

## 2020-09-14 RX ORDER — BUPROPION HYDROCHLORIDE 300 MG/1
TABLET ORAL
Qty: 30 TABLET | Refills: 1 | OUTPATIENT
Start: 2020-09-14

## 2020-09-28 RX ORDER — DEXTROAMPHETAMINE SACCHARATE, AMPHETAMINE ASPARTATE, DEXTROAMPHETAMINE SULFATE AND AMPHETAMINE SULFATE 7.5; 7.5; 7.5; 7.5 MG/1; MG/1; MG/1; MG/1
30 TABLET ORAL 2 TIMES DAILY
Qty: 60 TABLET | Refills: 0 | Status: SHIPPED | OUTPATIENT
Start: 2020-09-28 | End: 2020-10-26 | Stop reason: SDUPTHER

## 2020-09-28 NOTE — TELEPHONE ENCOUNTER
Javad Jay [phone] (home)    is requesting refill(s) of medication Adderall  to preferred pharmacy [facility], [address]    Last OV 08-27-20 (pertaining to medication)   Last refill 08-31-20 (per medication requested)  Next office visit scheduled or attempted Yes  Date 11-2-20  If No, reason made

## 2020-09-29 RX ORDER — PREDNISONE 20 MG/1
TABLET ORAL
Qty: 18 TABLET | Refills: 1 | Status: SHIPPED | OUTPATIENT
Start: 2020-09-29 | End: 2020-11-02 | Stop reason: ALTCHOICE

## 2020-09-29 NOTE — TELEPHONE ENCOUNTER
Sony Obrien is requesting refill(s) prednisone  Last OV 8/7/20 (pertaining to medication)  LR 6/4/20 (per medication requested)  Next office visit scheduled or attempted Yes   If no, reason:  11/2/20

## 2020-10-26 RX ORDER — DEXTROAMPHETAMINE SACCHARATE, AMPHETAMINE ASPARTATE, DEXTROAMPHETAMINE SULFATE AND AMPHETAMINE SULFATE 7.5; 7.5; 7.5; 7.5 MG/1; MG/1; MG/1; MG/1
30 TABLET ORAL 2 TIMES DAILY
Qty: 60 TABLET | Refills: 0 | Status: SHIPPED | OUTPATIENT
Start: 2020-10-26 | End: 2020-11-23 | Stop reason: SDUPTHER

## 2020-10-26 NOTE — TELEPHONE ENCOUNTER
Bhanu Moulton [phone] (home)    is requesting refill(s) of medication   [facility] [address] [address] 8/7/20 (pertaining to medication)   Last refill 9/28/20 (per medication requested)  Next office visit scheduled or attempted Yes  Date 11/2/20

## 2020-11-02 ENCOUNTER — OFFICE VISIT (OUTPATIENT)
Dept: FAMILY MEDICINE CLINIC | Age: 30
End: 2020-11-02

## 2020-11-02 VITALS
SYSTOLIC BLOOD PRESSURE: 120 MMHG | WEIGHT: 199 LBS | TEMPERATURE: 98.1 F | OXYGEN SATURATION: 98 % | BODY MASS INDEX: 26.95 KG/M2 | DIASTOLIC BLOOD PRESSURE: 60 MMHG | HEART RATE: 111 BPM | HEIGHT: 72 IN

## 2020-11-02 PROCEDURE — 90471 IMMUNIZATION ADMIN: CPT | Performed by: FAMILY MEDICINE

## 2020-11-02 PROCEDURE — 90686 IIV4 VACC NO PRSV 0.5 ML IM: CPT | Performed by: FAMILY MEDICINE

## 2020-11-02 PROCEDURE — 99213 OFFICE O/P EST LOW 20 MIN: CPT | Performed by: FAMILY MEDICINE

## 2020-11-02 RX ORDER — IPRATROPIUM BROMIDE AND ALBUTEROL SULFATE 2.5; .5 MG/3ML; MG/3ML
1 SOLUTION RESPIRATORY (INHALATION) EVERY 6 HOURS PRN
Qty: 30 VIAL | Refills: 2 | Status: SHIPPED | OUTPATIENT
Start: 2020-11-02 | End: 2021-04-16 | Stop reason: SDUPTHER

## 2020-11-02 RX ORDER — PREDNISONE 20 MG/1
TABLET ORAL
Qty: 18 TABLET | Refills: 1 | Status: SHIPPED | OUTPATIENT
Start: 2020-11-02 | End: 2021-04-16 | Stop reason: SDUPTHER

## 2020-11-02 ASSESSMENT — ENCOUNTER SYMPTOMS
CONSTIPATION: 0
COUGH: 1
WHEEZING: 1
SORE THROAT: 0
ABDOMINAL PAIN: 0
CHEST TIGHTNESS: 1
RHINORRHEA: 0
DIARRHEA: 0
BLOOD IN STOOL: 0
SHORTNESS OF BREATH: 1

## 2020-11-02 NOTE — PATIENT INSTRUCTIONS
-finish prednisone pack  -use albuterol inhaler/nebs and duo nebulizer PRN    -continue your Adderall 30 mg BID  influenza virus vaccine (injection)  Pronunciation:  in brian NIEVES seen  Brand:  Afluria, Agriflu, Fluad [id number], Fluarix, Flublok Quadrivalent [id number], Flucelvax, FluLaval, Fluogen, Flushield, Fluvirin, Fluzone  What is the most important information I should know about this vaccine? The injectable influenza virus vaccine (flu shot) is a \"killed virus\" vaccine. Influenza virus vaccine is also available in a nasal spray form, which is a \"live virus\" vaccine. This medication guide addresses only the injectable form of this vaccine. Becoming infected with influenza is much more dangerous to your health than receiving this vaccine. However, like any medicine, this vaccine can cause side effects but the risk of serious side effects is extremely low. What is influenza virus vaccine? Influenza virus (commonly known as \"the flu\") is a serious disease caused by a virus. Influenza virus can spread from one person to another through small droplets of saliva that are expelled into the air when an infected person coughs or sneezes. The virus can also be passed through contact with objects the infected person has touched, such as a door handle or other surfaces. Influenza virus vaccine is used to prevent infection caused by influenza virus. The vaccine is redeveloped each year to contain specific strains of inactivated (killed) flu virus that are recommended by public health officials for that year. The injectable influenza virus vaccine (flu shot) is a \"killed virus\" vaccine. Influenza virus vaccine is also available in a nasal spray form, which is a \"live virus\" vaccine. Influenza virus vaccine works by exposing you to a small dose of the virus, which helps your body to develop immunity to the disease.  Influenza virus vaccine will not treat an active infection that has already developed in the body.  Influenza virus vaccine is for use in adults and children who are at least 7 months old. Becoming infected with influenza is much more dangerous to your health than receiving this vaccine. Influenza causes thousands of deaths each year, and hundreds of thousands of hospitalizations. However, like any medicine, this vaccine can cause side effects but the risk of serious side effects is extremely low. Like any vaccine, influenza virus vaccine may not provide protection from disease in every person. This vaccine will not prevent illness caused by sandra flu (\"bird flu\"). What should I discuss with my healthcare provider before receiving this vaccine? You may not be able to receive this vaccine if you are allergic to eggs, or if you have:  · a history of severe allergic reaction to a flu vaccine; or  · a history of Guillain-[place] syndrome (within 6 weeks after receiving a flu vaccine). Tell your doctor if you have ever had:  · bleeding problems;  · a neurologic disorder or disease affecting the brain (or if this was a reaction to a previous vaccine);  · seizures;  · a weak immune system caused by disease, bone marrow transplant, or by using certain medicines or receiving cancer treatments; or  · an allergy to latex. You can still receive a vaccine if you have a minor cold. If you have a severe illness with a fever or any type of infection, wait until you get better before receiving this vaccine. The Centers for Disease Control and Prevention recommends that pregnant women get a flu shot during any trimester of pregnancy to protect themselves and their  babies from flu. The nasal spray form of influenza vaccine is not recommended for use in pregnant women. It may not be safe to breastfeed while using this medicine. Ask your doctor about any risk. This vaccine should not be given to a child younger than 7 months old. How is this vaccine given?   Some brands of this vaccine are made for use in adults reaction after the first shot. Keep track of any and all side effects you have after receiving this vaccine. If you ever need to receive influenza virus vaccine in the future, you will need to tell your doctor if the previous shot caused any side effects. Influenza virus injectable (killed virus) vaccine will not cause you to become ill with the flu virus that it contains. However, you may have flu-like symptoms at any time during flu season that may be caused by other strains of influenza virus. Call your doctor at once if you have:  · a light-headed feeling, like you might pass out;  · severe weakness or unusual feeling in your arms and legs (may occur 2 to 4 weeks after you receive the vaccine);  · high fever;  · seizure (convulsions); or  · unusual bleeding. Common side effects may include:  · low fever, chills;  · mild fussiness or crying;  · redness, bruising, pain, swelling, or a lump where the vaccine was injected;  · headache, tired feeling; or  · joint or muscle pain. This is not a complete list of side effects and others may occur. Call your doctor for medical advice about side effects. You may report vaccine side effects to the [address] [address] and Human Services at [phone]. What other drugs will affect influenza virus injectable vaccine? If you are using any of these medications, you may not be able to receive the vaccine, or may need to wait until the other treatments are finished:  · phenytoin, theophylline, or warfarin (Coumadin, Jantoven);  · an oral, nasal, inhaled, or injectable steroid medicine;  · medications to treat psoriasis, rheumatoid arthritis, or other autoimmune disorders--azathioprine, etanercept, leflunomide, and others; or  · medicines to treat or prevent organ transplant rejection--basiliximab, cyclosporine, muromonab-CD3, mycophenolate mofetil, sirolimus, tacrolimus. This list is not complete.  Other drugs may affect influenza virus vaccine, including prescription and over-the-counter medicines, vitamins, and herbal products. Not all possible drug interactions are listed here. Where can I get more information? Your doctor or pharmacist can provide more information about this vaccine. Additional information is available from your local health department or the Centers for Disease Control and Prevention. Remember, keep this and all other medicines out of the reach of children, never share your medicines with others, and use this medication only for the indication prescribed. Every effort has been made to ensure that the information provided by [address] is accurate, up-to-date, and complete, but no guarantee is made to that effect. Drug information contained herein may be time sensitive. [facility] information has been compiled for use by healthcare practitioners and consumers in the [place] and therefore [facility] does not warrant that uses outside of the [place] are appropriate, unless specifically indicated otherwise. [facility]'s drug information does not endorse drugs, diagnose patients or recommend therapy. [facility] drug information is an informational resource designed to assist licensed healthcare practitioners in caring for their patients and/or to serve consumers viewing this service as a supplement to, and not a substitute for, the expertise, skill, knowledge and judgment of healthcare practitioners. The absence of a warning for a given drug or drug combination in no way should be construed to indicate that the drug or drug combination is safe, effective or appropriate for any given patient. [facility] does not assume any responsibility for any aspect of healthcare administered with the aid of information [facility] provides. The information contained herein is not intended to cover all possible uses, directions, precautions, warnings, drug interactions, allergic reactions, or adverse effects.  If you have questions about the drugs you

## 2020-11-02 NOTE — PROGRESS NOTES
Vaccine Information Sheet, \"Influenza - Inactivated\"  given to Alejandro Hill, or parent/legal guardian of  Alejandro Hill and verbalized understanding. Patient responses:    Have you ever had a reaction to a flu vaccine? No  Do you have any current illness? No  Have you ever had Guillian [place] Syndrome? No  Do you have a serious allergy to any of the follow: Neomycin, Polymyxin, Thimerosal, eggs or egg products? No    Flu vaccine given per order. Please see immunization tab. Risks and benefits explained. Current VIS given.       Immunizations Administered     Name Date Dose Route    Influenza, Quadv, IM, PF (6 mo and older Fluzone, Flulaval, Fluarix, and 3 yrs and older Afluria) 11/2/2020 0.5 mL Intramuscular    Site: Deltoid- Left    Lot: [id number]    NDC: 01097-766-88

## 2020-11-02 NOTE — PROGRESS NOTES
SUBJECTIVE:  Chief Complaint   Patient presents with    ADD     pt states that he is here for a 3 month f/u, will need refills.     Flu Vaccine     pt is requesting a flu vaccine    Asthma     HPI     Heather Jordan is a 27 y.o.male that presents today for ADHD/Asthma follow up, flu shot:    ADHD:  Adderall 30 mg BID  Medication since 2004  Wellbutrin 300 mg XL daily on and off since high school as well  Appetite increased due to prednisone but not stimulant per patient  Sleep is sporadic but owes to the prednisone    -Asthma:  -Has exacerbation from dust mites when turn heat on every fall  -Qvar, duoneb, flonase, zyrtec, singulair, albuterol inhaler  Started having issues an    -Weight change:  -has lost 10 pounds in past 8 months  Wt Readings from Last 3 Encounters:   11/02/20 199 lb (90.3 kg)   08/07/20 207 lb 6.4 oz (94.1 kg)   03/11/20 209 lb (94.8 kg)     -Pt wants flu vaccine: received today 11/2/2020     Had shot of H1N1 in [place]21      Past Medical History:   Diagnosis Date    ADHD (attention deficit hyperactivity disorder)     Allergic rhinitis     Asthma     Depression     RAD (reactive airway disease)      Past Surgical History:   Procedure Laterality Date    TYMPANOPLASTY Right 2019    had this past Summer    TYMPANOSTOMY TUBE PLACEMENT      x5 - also eardrum repaired (?), and had Ti's angina and separatefacial infection     Family History   Problem Relation Age of Onset    Depression Mother     Hearing Loss Father     Heart Disease Father 61        MI - smoker     Current Outpatient Medications   Medication Sig Dispense Refill    ipratropium-albuterol (DUONEB) 0.5-2.5 (3) MG/3ML SOLN nebulizer solution Inhale 3 mLs into the lungs every 6 hours as needed for Shortness of Breath 30 vial 2    predniSONE (DELTASONE) 20 MG tablet TAKE 3 TABLETS X 3 DAYS, 2 TABLETS X 3 DAYS, 1 TABLET X 2 DAYS, ONE-HALF TABLET X 2 DAYS 18 tablet 1    amphetamine-dextroamphetamine (ADDERALL, 30MG,) 30 MG tablet Take 1 tablet by mouth 2 times daily for 30 days. as directed. 60 tablet 0    albuterol sulfate  (90 Base) MCG/ACT inhaler INHALE 2 PUFFS INTO THE LUNGS EVERY 6 HOURS AS NEEDED FOR WHEEZING 6.7 g 2    buPROPion (WELLBUTRIN XL) 300 MG extended release tablet TAKE ONE TABLET BY MOUTH EVERY MORNING 30 tablet 1    QVAR REDIHALER 40 MCG/ACT AERB inhaler INHALE 1 PUFF PO BID  3    omeprazole (PRILOSEC) 40 MG delayed release capsule TAKE 1 CAPSULE BY MOUTH DAILY AS DIRECTED 90 capsule 3    fluticasone (FLONASE) 50 MCG/ACT nasal spray SHAKE LIQUID AND USE 1 TO 2 SPRAYS IN EACH NOSTRIL DAILY AS NEEDED FOR CONGESTION OR ALLERGIES 3 Bottle 3    cetirizine (ZYRTEC) 10 MG tablet Take 1 tablet by mouth daily As needed for allergies. 90 tablet 3    montelukast (SINGULAIR) 10 MG tablet TAKE 1 TABLET BY MOUTH DAILY FOR ALLERGIES OR ASTHMA 30 tablet 12    Naproxen Sodium (ALEVE) 220 MG CAPS Take by mouth      albuterol (PROVENTIL) (2.5 MG/3ML) 0.083% nebulizer solution Take 3 mLs by nebulization every 6 hours as needed for Wheezing 25 each 2    calcium carbonate (TUMS) 500 MG chewable tablet Take 1 tablet by mouth daily. No current facility-administered medications for this visit. 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 HENT: Negative for congestion, rhinorrhea and sore throat. Eyes: Negative for visual disturbance. Respiratory: Positive for cough, chest tightness, shortness of breath and wheezing. Cardiovascular: Negative for chest pain and palpitations. Gastrointestinal: Negative for abdominal pain, blood in stool, constipation and diarrhea. Endocrine: Negative for polyuria. Genitourinary: Negative for dysuria and hematuria. Musculoskeletal: Negative for arthralgias. Skin: Negative for rash. Neurological: Negative for weakness, numbness and headaches. Psychiatric/Behavioral: Positive for dysphoric mood and sleep disturbance. The patient is nervous/anxious. OBJECTIVE:  /60   Pulse 111   Temp 98.1 °F (36.7 °C) (Temporal)   Ht 5' 11.6\" (1.819 m)   Wt 199 lb (90.3 kg)   SpO2 98%   BMI 27.29 kg/m²     Physical Exam  Constitutional:       General: He is not in acute distress. Appearance: He is well-developed. HENT:      Head: Normocephalic and atraumatic. Right Ear: Tympanic membrane normal.      Left Ear: Tympanic membrane normal.      Nose: Nose normal. No rhinorrhea. Mouth/Throat:      Pharynx: Uvula midline. Eyes:      Pupils: Pupils are equal, round, and reactive to light. Neck:      Trachea: No tracheal deviation. Cardiovascular:      Rate and Rhythm: Regular rhythm. Tachycardia present. Heart sounds: Normal heart sounds. No murmur. No friction rub. No gallop. Pulmonary:      Effort: Pulmonary effort is normal. No respiratory distress. Breath sounds: Normal breath sounds. No wheezing or rales. Abdominal:      General: Bowel sounds are normal. There is no distension. Palpations: Abdomen is soft. Tenderness: There is no abdominal tenderness. There is no rebound. Musculoskeletal: Normal range of motion. General: No tenderness. Lymphadenopathy:      Cervical: No cervical adenopathy.    Skin:     General: Skin is warm and dry. Findings: No erythema or rash. Neurological:      Mental Status: He is alert and oriented to person, place, and time. Cranial Nerves: No cranial nerve deficit. Psychiatric:         Speech: Speech normal.         Thought Content: Thought content does not include homicidal or suicidal ideation. ASSESSMENT/PLAN:  Archana Walker is a 79-year-old male who presents today for ADHD/med check/med management/stimulant refill/ follow-up, asthma exacerbation follow-up, and flu vaccine. Finishing up a prednisone steroid taper and uses Dulera, DuoNebs, albuterol inhalers as needed. Tolerating his Adderall 30 mg twice daily. Not due for refill until 11/25/2020. Controlled drug agreement and urine drug screen from March 2020. E visit/med check in 3 months from future refill, and in person visit every 6 months. 1. Attention deficit hyperactivity disorder (ADHD), predominantly inattentive type  2. Medication management  -continue Adderall 30 mg BID  -UDS/CDA 3/11/2020    3. Moderate persistent asthma with exacerbation  - ipratropium-albuterol (DUONEB) 0.5-2.5 (3) MG/3ML SOLN nebulizer solution; Inhale 3 mLs into the lungs every 6 hours as needed for Shortness of Breath  Dispense: 30 vial; Refill: 2  - predniSONE (DELTASONE) 20 MG tablet; TAKE 3 TABLETS X 3 DAYS, 2 TABLETS X 3 DAYS, 1 TABLET X 2 DAYS, ONE-HALF TABLET X 2 DAYS  Dispense: 18 tablet; Refill: 1    4. Need for influenza vaccination  - INFLUENZA, QUADV, 3 YRS AND OLDER, IM PF, PREFILL SYR OR SDV, 0.5ML (AFLURIA QUADV, PF)    -Reviewed treatment plan with patient. Patient verbalized understanding to treatment plan and questions were answered.    -Spent >15 minutes of face to face interaction with patient counseling on diagnoses and treatment plan    Return in about 3 months (around 2/2/2021) for e visit for ADD/med check, 6 months in person. Logan Lopes.  Myra Eaton.      11/2/2020

## 2020-11-23 RX ORDER — DEXTROAMPHETAMINE SACCHARATE, AMPHETAMINE ASPARTATE, DEXTROAMPHETAMINE SULFATE AND AMPHETAMINE SULFATE 7.5; 7.5; 7.5; 7.5 MG/1; MG/1; MG/1; MG/1
30 TABLET ORAL 2 TIMES DAILY
Qty: 60 TABLET | Refills: 0 | Status: SHIPPED | OUTPATIENT
Start: 2020-11-23 | End: 2020-12-21 | Stop reason: SDUPTHER

## 2020-11-23 NOTE — TELEPHONE ENCOUNTER
Archana Walker [phone] (home)    is requesting refill(s) of medication Adderall   to preferred pharmacy [facility], [facility] 11-2-20 (pertaining to medication)   Last refill 10-26-20 (per medication requested)  Next office visit scheduled or attempted No  Date will call back and scheduile  If No, reason see above

## 2020-11-24 NOTE — TELEPHONE ENCOUNTER
1. Attention deficit hyperactivity disorder (ADHD), predominantly inattentive type  - amphetamine-dextroamphetamine (ADDERALL, 30MG,) 30 MG tablet; Take 1 tablet by mouth 2 times daily for 30 days. as directed. Dispense: 60 tablet; Refill: 0    Current Outpatient Medications   Medication Sig Dispense Refill    amphetamine-dextroamphetamine (ADDERALL, 30MG,) 30 MG tablet Take 1 tablet by mouth 2 times daily for 30 days. as directed. 60 tablet 0    ipratropium-albuterol (DUONEB) 0.5-2.5 (3) MG/3ML SOLN nebulizer solution Inhale 3 mLs into the lungs every 6 hours as needed for Shortness of Breath 30 vial 2    predniSONE (DELTASONE) 20 MG tablet TAKE 3 TABLETS X 3 DAYS, 2 TABLETS X 3 DAYS, 1 TABLET X 2 DAYS, ONE-HALF TABLET X 2 DAYS 18 tablet 1    albuterol sulfate  (90 Base) MCG/ACT inhaler INHALE 2 PUFFS INTO THE LUNGS EVERY 6 HOURS AS NEEDED FOR WHEEZING 6.7 g 2    buPROPion (WELLBUTRIN XL) 300 MG extended release tablet TAKE ONE TABLET BY MOUTH EVERY MORNING 30 tablet 1    QVAR REDIHALER 40 MCG/ACT AERB inhaler INHALE 1 PUFF PO BID  3    omeprazole (PRILOSEC) 40 MG delayed release capsule TAKE 1 CAPSULE BY MOUTH DAILY AS DIRECTED 90 capsule 3    fluticasone (FLONASE) 50 MCG/ACT nasal spray SHAKE LIQUID AND USE 1 TO 2 SPRAYS IN EACH NOSTRIL DAILY AS NEEDED FOR CONGESTION OR ALLERGIES 3 Bottle 3    cetirizine (ZYRTEC) 10 MG tablet Take 1 tablet by mouth daily As needed for allergies. 90 tablet 3    montelukast (SINGULAIR) 10 MG tablet TAKE 1 TABLET BY MOUTH DAILY FOR ALLERGIES OR ASTHMA 30 tablet 12    Naproxen Sodium (ALEVE) 220 MG CAPS Take by mouth      albuterol (PROVENTIL) (2.5 MG/3ML) 0.083% nebulizer solution Take 3 mLs by nebulization every 6 hours as needed for Wheezing 25 each 2    calcium carbonate (TUMS) 500 MG chewable tablet Take 1 tablet by mouth daily. No current facility-administered medications for this visit.       PDMP Monitoring:    Last PDMP Katerin Ozuna as Reviewed

## 2020-11-30 RX ORDER — BUPROPION HYDROCHLORIDE 300 MG/1
TABLET ORAL
Qty: 30 TABLET | Refills: 5 | Status: SHIPPED | OUTPATIENT
Start: 2020-11-30 | End: 2021-04-16 | Stop reason: SDUPTHER

## 2020-11-30 NOTE — TELEPHONE ENCOUNTER
Dylan Mcrae is requesting refill(s) bupropion  Last OV 11/2/20 (pertaining to medication)  LR 9/14/20 (per medication requested)  Next office visit scheduled or attempted No   If no, reason:

## 2020-12-18 NOTE — TELEPHONE ENCOUNTER
Masood [place] [phone] (home)    is requesting refill(s) of medication ADDERALL to preferred pharmacy [address] [place] [address] 11/2/20 (pertaining to medication)   Last refill 11/23/20 (per medication requested)  Next office visit scheduled or attempted No  Date N/A  If No, reason N/A

## 2020-12-21 RX ORDER — DEXTROAMPHETAMINE SACCHARATE, AMPHETAMINE ASPARTATE, DEXTROAMPHETAMINE SULFATE AND AMPHETAMINE SULFATE 7.5; 7.5; 7.5; 7.5 MG/1; MG/1; MG/1; MG/1
30 TABLET ORAL 2 TIMES DAILY
Qty: 60 TABLET | Refills: 0 | Status: SHIPPED | OUTPATIENT
Start: 2020-12-21 | End: 2021-01-19 | Stop reason: SDUPTHER

## 2021-01-19 ENCOUNTER — TELEPHONE (OUTPATIENT)
Dept: PRIMARY CARE CLINIC | Age: 31
End: 2021-01-19

## 2021-01-19 DIAGNOSIS — F90.0 ATTENTION DEFICIT HYPERACTIVITY DISORDER (ADHD), PREDOMINANTLY INATTENTIVE TYPE: ICD-10-CM

## 2021-01-19 RX ORDER — DEXTROAMPHETAMINE SACCHARATE, AMPHETAMINE ASPARTATE, DEXTROAMPHETAMINE SULFATE AND AMPHETAMINE SULFATE 7.5; 7.5; 7.5; 7.5 MG/1; MG/1; MG/1; MG/1
30 TABLET ORAL 2 TIMES DAILY
Qty: 60 TABLET | Refills: 0 | Status: SHIPPED | OUTPATIENT
Start: 2021-01-19 | End: 2021-02-19 | Stop reason: SDUPTHER

## 2021-02-16 ENCOUNTER — TELEPHONE (OUTPATIENT)
Dept: PRIMARY CARE CLINIC | Age: 31
End: 2021-02-16

## 2021-02-16 DIAGNOSIS — F90.0 ATTENTION DEFICIT HYPERACTIVITY DISORDER (ADHD), PREDOMINANTLY INATTENTIVE TYPE: ICD-10-CM
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
## 2021-02-19 RX ORDER — DEXTROAMPHETAMINE SACCHARATE, AMPHETAMINE ASPARTATE, DEXTROAMPHETAMINE SULFATE AND AMPHETAMINE SULFATE 7.5; 7.5; 7.5; 7.5 MG/1; MG/1; MG/1; MG/1
30 TABLET ORAL 2 TIMES DAILY
Qty: 60 TABLET | Refills: 0 | Status: SHIPPED | OUTPATIENT
Start: 2021-02-19 | End: 2021-03-17 | Stop reason: SDUPTHER

## 2021-02-19 NOTE — TELEPHONE ENCOUNTER
Called and informed pharmacy to cancel prescription. Lm informing pt of medication refill being sent to pharmacy.

## 2021-03-03 ENCOUNTER — IMMUNIZATION (OUTPATIENT)
Dept: PRIMARY CARE CLINIC | Age: 31
End: 2021-03-03
Payer: MEDICAID

## 2021-03-03 PROCEDURE — 0001A COVID-19, PFIZER VACCINE 30MCG/0.3ML DOSE: CPT | Performed by: FAMILY MEDICINE

## 2021-03-03 PROCEDURE — 91300 COVID-19, PFIZER VACCINE 30MCG/0.3ML DOSE: CPT | Performed by: FAMILY MEDICINE

## 2021-03-17 ENCOUNTER — TELEPHONE (OUTPATIENT)
Dept: PRIMARY CARE CLINIC | Age: 31
End: 2021-03-17

## 2021-03-17 DIAGNOSIS — F90.0 ATTENTION DEFICIT HYPERACTIVITY DISORDER (ADHD), PREDOMINANTLY INATTENTIVE TYPE: ICD-10-CM

## 2021-03-17 RX ORDER — DEXTROAMPHETAMINE SACCHARATE, AMPHETAMINE ASPARTATE, DEXTROAMPHETAMINE SULFATE AND AMPHETAMINE SULFATE 7.5; 7.5; 7.5; 7.5 MG/1; MG/1; MG/1; MG/1
30 TABLET ORAL 2 TIMES DAILY
Qty: 60 TABLET | Refills: 0 | Status: SHIPPED | OUTPATIENT
Start: 2021-03-17 | End: 2021-04-16 | Stop reason: SDUPTHER

## 2021-03-17 NOTE — TELEPHONE ENCOUNTER
[address] is calling needing a refill of   amphetamine-dextroamphetamine (ADDERALL, 30MG,) 30 MG tablet    Paul Morales [address] [phone] Yohana Hinojosa [phone]   Travis Mccarty [place], [address] [place] [id number]   Phone:  [phone]  Fax:  [phone]

## 2021-03-24 ENCOUNTER — IMMUNIZATION (OUTPATIENT)
Dept: PRIMARY CARE CLINIC | Age: 31
End: 2021-03-24
Payer: MEDICAID

## 2021-03-24 PROCEDURE — 91300 COVID-19, PFIZER VACCINE 30MCG/0.3ML DOSE: CPT | Performed by: FAMILY MEDICINE

## 2021-03-24 PROCEDURE — 0002A COVID-19, PFIZER VACCINE 30MCG/0.3ML DOSE: CPT | Performed by: FAMILY MEDICINE

## 2021-04-15 DIAGNOSIS — F90.0 ATTENTION DEFICIT HYPERACTIVITY DISORDER (ADHD), PREDOMINANTLY INATTENTIVE TYPE: ICD-10-CM

## 2021-04-15 RX ORDER — DEXTROAMPHETAMINE SACCHARATE, AMPHETAMINE ASPARTATE, DEXTROAMPHETAMINE SULFATE AND AMPHETAMINE SULFATE 7.5; 7.5; 7.5; 7.5 MG/1; MG/1; MG/1; MG/1
30 TABLET ORAL 2 TIMES DAILY
Qty: 60 TABLET | Refills: 0 | OUTPATIENT
Start: 2021-04-15 | End: 2021-05-15

## 2021-04-15 NOTE — TELEPHONE ENCOUNTER
----- Message from Vadim Doherty sent at 4/14/2021  4:50 PM EDT -----  Subject: Medication Problem    QUESTIONS  Name of Medication? amphetamine-dextroamphetamine (ADDERALL   30MG  ) 30 MG tablet  Patient-reported dosage and instructions? 30 MG. twice daily   What question or problem do you have with the medication? Patient wanted   to DISREGARD the refill request for the medication because he has an   appointment scheduled for Friday 04/16 with Beka Sac   Preferred Pharmacy? [address] [phone]  Pharmacy phone number (if available)? [phone]  Additional Information for Provider?   ---------------------------------------------------------------------------  --------------  CALL BACK INFO  What is the best way for the office to contact you? OK to leave message on   voicemail  Preferred Call Back Phone Number? [phone]  ---------------------------------------------------------------------------  --------------  SCRIPT ANSWERS  Relationship to Patient?  Self

## 2021-04-16 ENCOUNTER — OFFICE VISIT (OUTPATIENT)
Dept: PRIMARY CARE CLINIC | Age: 31
End: 2021-04-16
Payer: MEDICAID

## 2021-04-16 VITALS
OXYGEN SATURATION: 98 % | SYSTOLIC BLOOD PRESSURE: 112 MMHG | DIASTOLIC BLOOD PRESSURE: 74 MMHG | HEART RATE: 91 BPM | WEIGHT: 198 LBS | BODY MASS INDEX: 26.82 KG/M2 | TEMPERATURE: 97.3 F | HEIGHT: 72 IN

## 2021-04-16 DIAGNOSIS — F33.0 MAJOR DEPRESSIVE DISORDER, RECURRENT EPISODE, MILD (HCC): ICD-10-CM

## 2021-04-16 DIAGNOSIS — J45.41 MODERATE PERSISTENT ASTHMA WITH EXACERBATION: ICD-10-CM

## 2021-04-16 DIAGNOSIS — E66.3 OVERWEIGHT (BMI 25.0-29.9): ICD-10-CM

## 2021-04-16 DIAGNOSIS — J30.1 ALLERGIC RHINITIS DUE TO POLLEN, UNSPECIFIED SEASONALITY: ICD-10-CM

## 2021-04-16 DIAGNOSIS — Z02.89 MEDICATION MANAGEMENT CONTRACT AGREEMENT: Primary | ICD-10-CM

## 2021-04-16 DIAGNOSIS — F90.0 ATTENTION DEFICIT HYPERACTIVITY DISORDER (ADHD), PREDOMINANTLY INATTENTIVE TYPE: ICD-10-CM

## 2021-04-16 PROBLEM — H52.203 MYOPIC ASTIGMATISM, BILATERAL: Status: ACTIVE | Noted: 2021-04-12

## 2021-04-16 PROBLEM — H52.13 MYOPIC ASTIGMATISM, BILATERAL: Status: ACTIVE | Noted: 2021-04-12

## 2021-04-16 PROCEDURE — 1036F TOBACCO NON-USER: CPT | Performed by: FAMILY MEDICINE

## 2021-04-16 PROCEDURE — G8419 CALC BMI OUT NRM PARAM NOF/U: HCPCS | Performed by: FAMILY MEDICINE

## 2021-04-16 PROCEDURE — G8427 DOCREV CUR MEDS BY ELIG CLIN: HCPCS | Performed by: FAMILY MEDICINE

## 2021-04-16 PROCEDURE — 99213 OFFICE O/P EST LOW 20 MIN: CPT | Performed by: FAMILY MEDICINE

## 2021-04-16 RX ORDER — CETIRIZINE HYDROCHLORIDE 10 MG/1
10 TABLET ORAL DAILY
Qty: 90 TABLET | Refills: 3 | Status: SHIPPED | OUTPATIENT
Start: 2021-04-16

## 2021-04-16 RX ORDER — DEXTROAMPHETAMINE SACCHARATE, AMPHETAMINE ASPARTATE, DEXTROAMPHETAMINE SULFATE AND AMPHETAMINE SULFATE 7.5; 7.5; 7.5; 7.5 MG/1; MG/1; MG/1; MG/1
30 TABLET ORAL
Qty: 30 TABLET | Refills: 0 | Status: SHIPPED | OUTPATIENT
Start: 2021-04-16 | End: 2021-05-13 | Stop reason: SDUPTHER

## 2021-04-16 RX ORDER — PREDNISONE 20 MG/1
TABLET ORAL
Qty: 18 TABLET | Refills: 1 | Status: SHIPPED | OUTPATIENT
Start: 2021-04-16 | End: 2021-06-01

## 2021-04-16 RX ORDER — FLUTICASONE PROPIONATE 50 MCG
SPRAY, SUSPENSION (ML) NASAL
Qty: 3 BOTTLE | Refills: 3 | Status: SHIPPED | OUTPATIENT
Start: 2021-04-16

## 2021-04-16 RX ORDER — IPRATROPIUM BROMIDE AND ALBUTEROL SULFATE 2.5; .5 MG/3ML; MG/3ML
1 SOLUTION RESPIRATORY (INHALATION) EVERY 6 HOURS PRN
Qty: 30 VIAL | Refills: 2 | Status: SHIPPED | OUTPATIENT
Start: 2021-04-16 | End: 2021-06-28

## 2021-04-16 RX ORDER — OMEPRAZOLE 40 MG/1
40 CAPSULE, DELAYED RELEASE ORAL DAILY
Qty: 90 CAPSULE | Refills: 3 | Status: SHIPPED | OUTPATIENT
Start: 2021-04-16 | End: 2022-04-04 | Stop reason: SDUPTHER

## 2021-04-16 RX ORDER — ALBUTEROL SULFATE 90 UG/1
2 AEROSOL, METERED RESPIRATORY (INHALATION) EVERY 6 HOURS PRN
Qty: 6.7 G | Refills: 2 | Status: SHIPPED | OUTPATIENT
Start: 2021-04-16 | End: 2021-05-14

## 2021-04-16 RX ORDER — BUPROPION HYDROCHLORIDE 300 MG/1
TABLET ORAL
Qty: 30 TABLET | Refills: 5 | Status: SHIPPED | OUTPATIENT
Start: 2021-04-16 | End: 2021-06-14

## 2021-04-16 RX ORDER — MONTELUKAST SODIUM 10 MG/1
10 TABLET ORAL NIGHTLY
Qty: 30 TABLET | Refills: 12 | Status: SHIPPED | OUTPATIENT
Start: 2021-04-16 | End: 2022-08-15 | Stop reason: SDUPTHER

## 2021-04-16 ASSESSMENT — ENCOUNTER SYMPTOMS
WHEEZING: 1
RHINORRHEA: 0
ABDOMINAL PAIN: 0
SHORTNESS OF BREATH: 1
EYE DISCHARGE: 0
BLOOD IN STOOL: 0
COLOR CHANGE: 0
CONSTIPATION: 0
SORE THROAT: 0
BACK PAIN: 0
CHEST TIGHTNESS: 1
EYE PAIN: 0
DIARRHEA: 0

## 2021-04-16 ASSESSMENT — PATIENT HEALTH QUESTIONNAIRE - PHQ9
7. TROUBLE CONCENTRATING ON THINGS, SUCH AS READING THE NEWSPAPER OR WATCHING TELEVISION: 0
1. LITTLE INTEREST OR PLEASURE IN DOING THINGS: 0
2. FEELING DOWN, DEPRESSED OR HOPELESS: 2
3. TROUBLE FALLING OR STAYING ASLEEP: 0
SUM OF ALL RESPONSES TO PHQ9 QUESTIONS 1 & 2: 2
SUM OF ALL RESPONSES TO PHQ QUESTIONS 1-9: 2
4. FEELING TIRED OR HAVING LITTLE ENERGY: 0

## 2021-04-16 ASSESSMENT — ANXIETY QUESTIONNAIRES
6. BECOMING EASILY ANNOYED OR IRRITABLE: 3-NEARLY EVERY DAY
2. NOT BEING ABLE TO STOP OR CONTROL WORRYING: 1-SEVERAL DAYS
1. FEELING NERVOUS, ANXIOUS, OR ON EDGE: 0-NOT AT ALL
7. FEELING AFRAID AS IF SOMETHING AWFUL MIGHT HAPPEN: 0-NOT AT ALL
3. WORRYING TOO MUCH ABOUT DIFFERENT THINGS: 1-SEVERAL DAYS
5. BEING SO RESTLESS THAT IT IS HARD TO SIT STILL: 3-NEARLY EVERY DAY

## 2021-04-16 NOTE — PATIENT INSTRUCTIONS
-Change adderall 30 mg twice a day to vyvanse 70 mg am and adderall 30 mg IR afternoon    -Get urine drug screen in the next 6 months; please try to pass it/not have THC in urine    -Follow up in 6 months for med check    Patient Education      lisdexamfetamine  Pronunciation:  lis dex am FET a robin  Brand:  Vyvanse  What is the most important information I should know about lisdexamfetamine? Lisdexamfetamine may be habit-forming, and this medicine is a drug of abuse. Tell your doctor if you have had problems with drug or alcohol abuse. Stimulants have caused stroke, heart attack, and sudden death in people with high blood pressure, heart disease, or a heart defect. Do not use lisdexamfetamine if you have used an MAO inhibitor in the past 14 days, such as isocarboxazid, linezolid, methylene blue injection, phenelzine, rasagiline, selegiline, or tranylcypromine. Lisdexamfetamine may cause new or worsening psychosis (unusual thoughts or behavior), especially if you have a history of depression, mental illness, or bipolar disorder. You may have blood circulation problems that can cause numbness, pain, or discoloration in your fingers or toes. Call your doctor right away if you have: signs of heart problems --chest pain, feeling light-headed or short of breath; signs of psychosis --paranoia, aggression, new behavior problems, seeing or hearing things that are not real; signs of circulation problems --unexplained wounds on your fingers or toes. What is lisdexamfetamine? Lisdexamfetamine is a central nervous system stimulant. It affects chemicals in the brain and nerves that contribute to hyperactivity and impulse control. Lisdexamfetamine is used to treat attention deficit hyperactivity disorder (ADHD) in adults and in children who are at least 10years old. Lisdexamfetamine is also used to treat moderate to severe binge eating disorder in adults.  This medicine is not to be used for obesity or weight is not approved to treat ADHD in a child younger than 10years old. Lisdexamfetamine is not approved to treat binge eating disorder in anyone younger than 25years old. How should I take lisdexamfetamine? Follow all directions on your prescription label. Your doctor may occasionally change your dose. Do not take this medicine in larger or smaller amounts or for longer than recommended. Lisdexamfetamine may be habit-forming. Never share lisdexamfetamine with another person, especially someone with a history of drug abuse or addiction. Keep the medication in a place where others cannot get to it. Selling or giving away this medicine is against the law. Take lisdexamfetamine with or without food, first thing in the morning. Read all patient information, medication guides, and instruction sheets provided to you. Ask your doctor or pharmacist if you have any questions. The chewable tablet must be chewed before you swallow it. Do not crush, chew, break, or divide a lisdexamfetamine capsule. Swallow it whole. To make swallowing easier, you may open the lisdexamfetamine capsule and sprinkle the medicine into a glass of water or orange juice, or mix it with yogurt. After the medicine has dissolved, drink or eat the mixture right away. Do not save for later use. While using this medicine, your doctor will need to check your progress at regular visits. Tell any doctor who treats you that you are using this medicine. Store at room temperature away from moisture, heat, and light. Keep track of your medicine. Lisdexamfetamine is a drug of abuse and you should be aware if anyone is using your medicine improperly or without a prescription. Throw away unused or  lisdexamfetamine in a sealed container or bag. Ask your pharmacist where to locate a community pharmaceutical take back disposal program.  What happens if I miss a dose? Take the missed dose as soon as you remember, but not late in the day.  Skip the missed dose if it is almost evening. Do not take extra medicine to make up the missed dose. What happens if I overdose? Seek emergency medical attention or call the Poison Help line at 1-511.224.3711. An overdose of lisdexamfetamine can be fatal.  Overdose symptoms may include restlessness, tremor, muscle twitches, rapid breathing, hostility, violence, panic, muscle pain or weakness, and dark colored urine. These symptoms may be followed by depression and tiredness. Overdose may also cause seizure or coma. What should I avoid while taking lisdexamfetamine? Lisdexamfetamine may impair your thinking or reactions. Be careful if you drive or do anything that requires you to be alert. What are the possible side effects of lisdexamfetamine? Get emergency medical help if you have signs of an allergic reaction: hives; difficult breathing; swelling of your face, lips, tongue, or throat. Call your doctor at once if you have:  · signs of heart problems --chest pain, trouble breathing, pounding heartbeats or fluttering in your chest, feeling like you might pass out;  · signs of psychosis --hallucinations (seeing or hearing things that are not real), new behavior problems, aggression, hostility, paranoia; or  · signs of circulation problems --numbness, pain, cold feeling, unexplained wounds, or skin color changes (pale, red, or blue appearance) in your fingers or toes. Seek medical attention right away if you have symptoms of serotonin syndrome, such as: agitation, hallucinations, fever, sweating, shivering, fast heart rate, muscle stiffness, twitching, loss of coordination, nausea, vomiting, or diarrhea. Lisdexamfetamine can affect growth in children. Tell your doctor if your child is not growing at a normal rate while using this medicine.   Common side effects may include:  · dry mouth, loss of appetite, weight loss;  · sleep problems (insomnia);  · fast heart rate, feeling jittery;  · dizziness, feeling anxious or irritable; or  · nausea, vomiting, stomach pain, diarrhea, constipation. This is not a complete list of side effects and others may occur. Call your doctor for medical advice about side effects. You may report side effects to FDA at 9-093-RYF-9334. What other drugs will affect lisdexamfetamine? Ask your doctor before using a stomach acid medicine (including Leticia-Alcalde or sodium bicarbonate). Some of these medicines can change the way your body absorbs lisdexamfetamine, and may increase side effects. Other drugs may interact with lisdexamfetamine, including prescription and over-the-counter medicines, vitamins, and herbal products. Tell your doctor about all your current medicines and any medicine you start or stop using. Where can I get more information? Your pharmacist can provide more information about lisdexamfetamine. Remember, keep this and all other medicines out of the reach of children, never share your medicines with others, and use this medication only for the indication prescribed. Every effort has been made to ensure that the information provided by [facility] [address] [address] is accurate, up-to-date, and complete, but no guarantee is made to that effect. Drug information contained herein may be time sensitive. [facility] information has been compiled for use by healthcare practitioners and consumers in the [place] and therefore [facility] does not warrant that uses outside of the [place] are appropriate, unless specifically indicated otherwise. [facility] drug information does not endorse drugs, diagnose patients or recommend therapy. [facility] information is an informational resource designed to assist licensed healthcare practitioners in caring for their patients and/or to serve consumers viewing this service as a supplement to, and not a substitute for, the expertise, skill, knowledge and judgment of healthcare practitioners.  The absence of a warning for a given drug or drug combination in wave test (EEG);  · coronary artery disease (clogged arteries); or  · blood circulation problems in the hands or feet. Taking this medicine during pregnancy can cause premature birth, low birth weight, or withdrawal symptoms in the  baby. Tell your doctor if you are pregnant or plan to become pregnant. It may not be safe to breast-feed a baby while you are using this medicine. Ask your doctor about any risks. Amphetamine is not approved for use by anyone younger than 10years old. How should I take amphetamine? Follow all directions on your prescription label and read all medication guides or instruction sheets. Your doctor may occasionally change your dose. Use the medicine exactly as directed. Amphetamine may be habit-forming. Misuse can cause addiction, overdose, or death. Keep the medication in a place where others cannot get to it. Selling or giving away this medicine is against the law. You may take amphetamine with or without food. Shake the oral suspension (liquid) before you measure a dose. Use the dosing syringe provided, or use a medicine dose-measuring device (not a kitchen spoon). Remove an orally disintegrating tablet (ODT) from the package only when you are ready to take the medicine. Place the tablet in your mouth and allow it to dissolve, without chewing. Swallow several times as the tablet dissolves. Your dose needs may change if you switch to a different brand, strength, or form of this medicine. Avoid medication errors by using only the form and strength your doctor prescribes. Your doctor will need to check your progress on a regular basis. Tell any doctor who treats you that you are using this medicine. Store at room temperature away from moisture and heat. Keep the bottle tightly closed when not in use. Keep track of your medicine. You should be aware if anyone is using it improperly or without a prescription. Do not keep leftover opioid medication.  Ask your pharmacist where to locate a drug take-back disposal program.  What happens if I miss a dose? Take the missed dose as soon as you remember, but not late in the day. Skip the missed dose if it is almost evening. Do not take two doses at one time. What happens if I overdose? Seek emergency medical attention or call the Poison Help line at 1-190.900.8834. An overdose of amphetamine could be fatal.  Overdose symptoms may include restlessness, tremor, muscle twitches, rapid breathing, hostility, violence, panic, muscle pain or weakness, and dark colored urine. These symptoms may be followed by depression and tiredness. Overdose may also cause seizure or coma. What should I avoid while taking amphetamine? Avoid drinking alcohol while you are taking this medicine. Avoid drinking fruit juices or taking vitamin C at the same time you take amphetamine. These can make your body absorb less of the medicine. Avoid driving or hazardous activity until you know how this medicine will affect you. Your reactions could be impaired. What are the possible side effects of amphetamine? Get emergency medical help if you have signs of an allergic reaction: hives; difficult breathing; swelling of your face, lips, tongue, or throat. Call your doctor at once if you have:  · signs of heart problems --chest pain, trouble breathing, feeling like you might pass out;  · signs of psychosis --hallucinations (seeing or hearing things that are not real), new behavior problems, aggression, hostility, paranoia;  · signs of circulation problems --numbness, pain, cold feeling, unexplained wounds, or skin color changes (pale, red, or blue appearance) in your fingers or toes;  · a seizure (convulsions);  · muscle twitches (tics); or  · changes in your vision.   Seek medical attention right away if you have symptoms of serotonin syndrome, such as: agitation, hallucinations, fever, sweating, shivering, fast heart rate, muscle stiffness, twitching, loss of coordination, nausea, vomiting, or diarrhea. Amphetamine can affect growth in children. Tell your doctor if your child is not growing at a normal rate while using this medicine. Common side effects may include:  · stomach pain, nausea, vomiting, diarrhea, constipation;  · loss of appetite, weight loss;  · mood changes, feeling restless or nervous, sleep problems (insomnia);  · dry mouth, unusual or unpleasant taste in the mouth;  · runny nose, nosebleeds;  · increased heart rate;  · headache, dizziness;  · itching; or  · impotence, sexual problems. This is not a complete list of side effects and others may occur. Call your doctor for medical advice about side effects. You may report side effects to FDA at 0-419-RVT-8291. What other drugs will affect amphetamine? Ask your doctor before using a stomach acid medicine (including Leticia-Pullman or sodium bicarbonate). Some of these medicines can change the way your body absorbs amphetamine, and may increase side effects. Many drugs can affect amphetamine. This includes prescription and over-the-counter medicines, vitamins, and herbal products. Not all possible interactions are listed here. Tell your doctor about all your current medicines and any medicine you start or stop using. Where can I get more information? Your pharmacist can provide more information about amphetamine. Remember, keep this and all other medicines out of the reach of children, never share your medicines with others, and use this medication only for the indication prescribed. Every effort has been made to ensure that the information provided by [facility] [address] is accurate, up-to-date, and complete, but no guarantee is made to that effect. Drug information contained herein may be time sensitive.  [facility] information has been compiled for use by healthcare practitioners and consumers in the [place] and therefore [facility] does not warrant that uses outside of the [place] are appropriate, unless specifically indicated otherwise. [facility] drug information does not endorse drugs, diagnose patients or recommend therapy. [facility] drug information is an informational resource designed to assist licensed healthcare practitioners in caring for their patients and/or to serve consumers viewing this service as a supplement to, and not a substitute for, the expertise, skill, knowledge and judgment of healthcare practitioners. The absence of a warning for a given drug or drug combination in no way should be construed to indicate that the drug or drug combination is safe, effective or appropriate for any given patient. [facility] does not assume any responsibility for any aspect of healthcare administered with the aid of information [facility] provides. The information contained herein is not intended to cover all possible uses, directions, precautions, warnings, drug interactions, allergic reactions, or adverse effects. If you have questions about the drugs you are taking, check with your doctor, nurse or pharmacist.  Copyright 5268-2288 [address]. Version: 3.01. Revision date: 8/1/2019. Care instructions adapted under license by [facility] ([facility]). If you have questions about a medical condition or this instruction, always ask your healthcare professional. [place] [address] any warranty or liability for your use of this information.

## 2021-04-16 NOTE — PROGRESS NOTES
SUBJECTIVE:  Chief Complaint   Patient presents with    Medication Check    ADHD    Asthma    Obesity     HPI   Nita Chris is a 27 y.o.male that presents today for ADHD/med check, asthma, overweight BMI  -Had both COVID 19 vaccines. 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, [employer] in [facility]    ADHD/depression:   On medication since teenager since age 15-17 y/o  Adderall 30 mg BID; wants to switch to vyvanse 75 mg am and 30 mg adderall IR afternoon  Wellbutrin 300 mg daily started in high school; on an foff  starrted Rx in 8th grade  Appetite not curbed  Not keyed up or bad mood  No issues with insomnia  If off medication for week sapped of energy  Had dental surgery about 1 month ago  Admits to [facility] use  Weight stable; up and down 10+ pounds in past year  Wt Readings from Last 3 Encounters:   04/16/21 198 lb (89.8 kg)   11/02/20 199 lb (90.3 kg)   08/07/20 207 lb 6.4 oz (94.1 kg)     PHQ-9 Total Score: 2 (4/16/2021 10:52 AM)  Thoughts that you would be better off dead, or of hurting yourself in some way: 0 (4/16/2021 10:52 AM)    ELSIE 7 SCORE 4/16/2021 3/11/2020   ELSIE-7 Total Score 8 4       -Asthma/enviromental allergies:  -Non smoker  -Qvar, flonase, zyrtec, singulair, albuterol inhaler, nebulizer       Past Medical History:   Diagnosis Date    ADHD (attention deficit hyperactivity disorder)     Allergic rhinitis     Asthma     Depression     RAD (reactive airway disease)        Past Surgical History:   Procedure Laterality Date    TYMPANOPLASTY Right 2019    had this past Summer    TYMPANOSTOMY TUBE PLACEMENT      x5 - also eardrum repaired (?), and had Ti's angina and separatefacial infection       Family History   Problem Relation Age of Onset    Depression Mother     Hearing Loss Father     Heart Disease Father 61        MI - smoker       Social History     Socioeconomic History    Marital status: Single     Spouse name: Not on file    Number of children: Not on breath and wheezing. Cardiovascular: Negative for chest pain and leg swelling. Gastrointestinal: Negative for abdominal pain, blood in stool, constipation and diarrhea. Endocrine: Negative for polyuria. Genitourinary: Negative for dysuria and flank pain. Musculoskeletal: Negative for arthralgias, back pain and neck pain. Skin: Negative for color change, rash and wound. Allergic/Immunologic: Negative for environmental allergies and food allergies. Neurological: Negative for dizziness, speech difficulty, weakness, light-headedness and headaches. Hematological: Negative for adenopathy. Does not bruise/bleed easily. Psychiatric/Behavioral: Negative for dysphoric mood and sleep disturbance. The patient is not nervous/anxious. OBJECTIVE:  /74   Pulse 91   Temp 97.3 °F (36.3 °C) (Temporal)   Ht 5' 11.6\" (1.819 m)   Wt 198 lb (89.8 kg)   SpO2 98%   BMI 27.15 kg/m²     Physical Exam  Constitutional:       General: He is not in acute distress. Appearance: He is well-developed. HENT:      Head: Normocephalic and atraumatic. Right Ear: Tympanic membrane normal.      Left Ear: Tympanic membrane normal.      Nose: Nose normal. No rhinorrhea. Mouth/Throat:      Pharynx: Uvula midline. Eyes:      Pupils: Pupils are equal, round, and reactive to light. Neck:      Trachea: No tracheal deviation. Cardiovascular:      Rate and Rhythm: Normal rate and regular rhythm. Heart sounds: Normal heart sounds. No murmur. No friction rub. No gallop. Pulmonary:      Effort: Pulmonary effort is normal. No respiratory distress. Breath sounds: Normal breath sounds. No wheezing or rales. Abdominal:      General: Bowel sounds are normal. There is no distension. Palpations: Abdomen is soft. Tenderness: There is no abdominal tenderness. There is no rebound. Musculoskeletal: Normal range of motion. General: No tenderness.    Lymphadenopathy:      Cervical: No cervical adenopathy. Skin:     General: Skin is warm and dry. Findings: No erythema or rash. Neurological:      Mental Status: He is alert and oriented to person, place, and time. Cranial Nerves: No cranial nerve deficit. Psychiatric:         Mood and Affect: Mood is anxious. Speech: Speech normal.         Behavior: Behavior is hyperactive. Thought Content: Thought content does not include homicidal or suicidal ideation. ASSESSMENT/PLAN:    Manoj Orantes is a 28 y/o male who was seen today for medication check, adhd, depression, asthma and overweight BMI. Patient is now insured and would like to going back on his previous regimen of Vyvanse 70 mg morning, Adderall 30 mg afternoon. Agreeable to plan. Controlled drug agreement today. Urine drug screen in the future. Continue asthma medications and allergic rhinitis medications. Omeprazole for GERD. Encourage diet and exercise. Follow-up in 6 months. 1. Attention deficit hyperactivity disorder (ADHD), predominantly inattentive type  2. Medication management contract agreement  - amphetamine-dextroamphetamine (ADDERALL, 30MG,) 30 MG tablet; Take 1 tablet by mouth Daily with lunch for 30 days. as directed. Dispense: 30 tablet; Refill: 0  - Drug Panel-PM-HI Res-UR Interp-A; Future  - lisdexamfetamine (VYVANSE) 70 MG capsule; Take 1 capsule by mouth every morning for 30 days. Dispense: 30 capsule; Refill: 0    3. Major depressive disorder, recurrent episode, mild (HCC)  - buPROPion (WELLBUTRIN XL) 300 MG extended release tablet; Take 1 tablet PO daily  Dispense: 30 tablet; Refill: 5  - Drug Panel-PM-HI Res-UR Interp-A; Future    4. Moderate persistent asthma with exacerbation  5. Allergic rhinitis due to pollen, unspecified seasonality  - albuterol sulfate  (90 Base) MCG/ACT inhaler;  Inhale 2 puffs into the lungs every 6 hours as needed for Wheezing  Dispense: 6.7 g; Refill: 2  - cetirizine (ZYRTEC) 10 MG tablet; Take 1 tablet by mouth daily As needed for allergies. Dispense: 90 tablet; Refill: 3  - fluticasone (FLONASE) 50 MCG/ACT nasal spray; SHAKE LIQUID AND USE 1 TO 2 SPRAYS IN EACH NOSTRIL DAILY AS NEEDED FOR CONGESTION OR ALLERGIES  Dispense: 3 Bottle; Refill: 3  - montelukast (SINGULAIR) 10 MG tablet; Take 1 tablet by mouth nightly  Dispense: 30 tablet; Refill: 12    6. Overweight (BMI 25.0-29.9)  -Recommend 150 minutes of cardiovascular activity a week, or 10,000 to 15,000 steps a day, 2 days of weightbearing  -dietary wise, try to stick to your weight x 10 in calories a day  -avoid processed/refined carbohydrates (boxed/canned/frozen/fast)  -encourage healthy protein and fat, butter, avocado, egg  Reviewed treatment plan with patient. Patient verbalized understanding to treatment plan and questions wereanswered. Return in about 6 months (around 10/16/2021). Bran Callejas.  Jw Eastman.      4/16/2021

## 2021-04-16 NOTE — LETTER
CONTROLLED SUBSTANCE MEDICATION AGREEMENT     Patient Name: Leena Falk  Patient YOB: 1990   I understand, that controlled substance medications may be used to help better manage my symptoms and to improve my ability to function at home, work and in social settings. However, I also understand that these medications do have risks, which have been discussed with me, including possible development of physical or psychological dependence. I understand that successful treatment requires mutual trust and honesty between me and my provider. I understand and agree that following this Medication Agreement is necessary in continuing my provider-patient relationship and the success of my treatment plan. Explanation from my Provider: Benefits and Goals of Controlled Substance Medications: There are two potential goals for your treatment: (1) decreased pain and suffering (2) improved daily life functions. There are many possible treatments for your chronic condition(s). Alternatives such as physical therapy, yoga, massage, home daily exercise, meditation, relaxation techniques, injections, chiropractic manipulations, surgery, cognitive therapy, hypnosis and many medications that are not habit-forming may be used. Use of controlled substance medications may be helpful, but they are unlikely to resolve all symptoms or restore all function. Explanation from my Provider: Risks of Controlled Substance Medications:  Opioid pain medications: These medications can lead to problems such as addiction/dependence, sedation, lightheadedness/dizziness, memory issues, falls, constipation, nausea, or vomiting. They may also impair the ability to drive or operate machinery. Additionally, these medications may lower testosterone levels, leading to loss of bone strength, stamina and sex drive.   They may cause problems with breathing, sleep apnea and reduced coughing, which is especially dangerous for patients with lung disease. Overdose or dangerous interactions with alcohol and other medications may occur, leading to death. Hyperalgesia may develop, which means patients receiving opioids for the treatment of pain may become more sensitive to certain painful stimuli, and in some cases, experience pain from ordinarily non-painful stimuli. Women between the ages of 14-53 who could become pregnant should carefully weigh the risks and benefits of opioids with their physicians, as these medications increase the risk of pregnancy complications, including miscarriage,  delivery and stillbirth. It is also possible for babies to be born addicted to opioids. Opioid dependence withdrawal symptoms may include; feelings of uneasiness, increased pain, irritability, belly pain, diarrhea, sweats and goose-flesh. Benzodiazepines and non-benzodiazepine sleep medications: These medications can lead to problems such as addiction/dependence, sedation, fatigue, lightheadedness, dizziness, incoordination, falls, depression, hallucinations, and impaired judgment, memory and concentration. The ability to drive and operate machinery may also be affected. Abnormal sleep-related behaviors have been reported, including sleepwalking, driving, making telephone calls, eating, or having sex while not fully awake. These medications can suppress breathing and worsen sleep apnea, particularly when combined with alcohol or other sedating medications, potentially leading to death. Dependence withdrawal symptoms may include tremors, anxiety, hallucinations and seizures. Stimulants:  Common adverse effects include addiction/dependence, increased blood  pressure and heart rate, decreased appetite, nausea, involuntary weight loss, insomnia,                                                                                                                     Initials:_______   irritability, and headaches.   These risks may increase when these medications are combined with other stimulants, such as caffeine pills or energy drinks, certain weight loss supplements and oral decongestants. Dependence withdrawal symptoms may include depressed mood, loss of interest, suicidal thoughts, anxiety, fatigue, appetite changes and agitation. Testosterone replacement therapy:  Potential side effects include increased risk of stroke and heart attack, blood clots, increased blood pressure, increased cholesterol, enlarged prostate, sleep apnea, irritability/aggression and other mood disorders, and decreased fertility. I agree and understand that I and my prescriber have the following rights and responsibilities regarding my treatment plan:     1. MY RIGHTS:  To be informed of my treatment and medication plan. To be an active participant in my health and wellbeing. 2. MY RESPONSIBILITY AND UNDERSTANDING FOR USE OF MEDICATIONS   I will take medications at the dose and frequency as directed. For my safety, I will not increase or change how I take my medications without the recommendation of my healthcare provider.  I will actively participate in any program recommended by my provider which may improve function, including social, physical, psychological programs.  I will not take my medications with alcohol or other drugs not prescribed to me. I understand that drinking alcohol with my medications increases the chances of side effects, including reduced breathing rate and could lead to personal injury when operating machinery.  I understand that if I have a history of substance use disorders, including alcohol or other illicit drugs, that I may be at increased risk of addiction to my medications.  I agree to notify my provider immediately if I should become pregnant so that my treatment plan can be adjusted.    I agree and understand that I shall only receive controlled substance medications from the prescriber that signed this agreement unless there is written agreement among other prescribers of controlled substances outlining the responsibility of the medications being prescribed.  I understand that the if the controlled medication is not helping to achieve goals, the dosage may be tapered and no longer prescribed. 3. MY RESPONSIBILITY FOR COMMUNICATION / PRESCRIPTION RENEWALS   I agree that all controlled substance medications that I take will be prescribed only by my provider. If another healthcare provider prescribes me medication in an emergency, I will notify my provider within seventy-two (72) hours.  I will arrange for refills at the prescribed interval ONLY during regular office hours. I will not ask for refills earlier than agreed, after-hours, on holidays or weekends. Refills may take up to 72 hours for processing and prescriptions to reach the pharmacy.  I will inform my other health care providers that I am taking these medications and of the existence of this [id number]. In the event of an emergency, I will provide the same information to the emergency department prescribers.  I will keep my provider updated on the pharmacy I am using for controlled medication prescription filling. Initials:_______  4. MY RESPONSIBILITY FOR PROTECTING MEDICATIONS   I will protect my prescriptions and medications. I understand that lost or misplaced prescriptions will not be replaced.  I will keep medications only for my own use and will not share them with others. I will keep all medications away from children.  I agree that if my medications are adjusted or discontinued, I will properly dispose of any remaining medications. I understand that I will be required to dispose of any remaining controlled medications as, directed by my prescriber, prior to being provided with any prescriptions for other controlled medications.   Medication drop box locations can be found at: [email or web address]    5. MY RESPONSIBILITY WITH ILLEGAL DRUGS    I will not use illegal or street drugs or another person's prescription medications not prescribed to me.  If there are identified addiction type symptoms, then referral to a program may be provided by my provider and I agree to follow through with this recommendation. 6. MY RESPONSIBILITY FOR COOPERATION WITH INVESTIGATIONS   I understand that my provider will comply with any applicable law and may discuss my use and/or possible misuse/abuse of controlled substances and alcohol, as appropriate, with any health care provider involved in my care, pharmacist, or legal authority.  I authorize my provider and pharmacy to cooperate fully with law enforcement agencies (as permitted by law) in the investigation of any possible misuse, sale, or other diversion of my controlled substances.  I agree to waive any applicable privilege or right of privacy or confidentiality with respect to these authorizations. 7. PROVIDERS RIGHT TO MONITOR FOR SAFETY: PRESCRIPTION MONITORING / DRUG TESTING   I consent to drug/toxicology screening and will submit to a drug screen upon my providers request to assure I am only taking the prescribed drugs for my safety monitoring. I understand that a drug screen is a laboratory test in which a sample of my urine, blood or saliva is checked to see what drugs I have been taking. This may entail an observed urine specimen, which means that a nurse or other health care provider may watch me provide urine, and I will cooperate if I am asked to provide an observed specimen.  I understand that my provider will check a copy of my State Prescription Monitoring Program () Report in order to safely prescribe medications.  Pill Counts: I consent to pill counts when requested.   I may be asked to bring all my prescribed

## 2021-05-13 ENCOUNTER — TELEPHONE (OUTPATIENT)
Dept: PRIMARY CARE CLINIC | Age: 31
End: 2021-05-13

## 2021-05-13 DIAGNOSIS — F90.0 ATTENTION DEFICIT HYPERACTIVITY DISORDER (ADHD), PREDOMINANTLY INATTENTIVE TYPE: ICD-10-CM

## 2021-05-13 DIAGNOSIS — Z02.89 MEDICATION MANAGEMENT CONTRACT AGREEMENT: ICD-10-CM

## 2021-05-13 DIAGNOSIS — F33.0 MAJOR DEPRESSIVE DISORDER, RECURRENT EPISODE, MILD (HCC): ICD-10-CM

## 2021-05-13 RX ORDER — DEXTROAMPHETAMINE SACCHARATE, AMPHETAMINE ASPARTATE, DEXTROAMPHETAMINE SULFATE AND AMPHETAMINE SULFATE 7.5; 7.5; 7.5; 7.5 MG/1; MG/1; MG/1; MG/1
30 TABLET ORAL
Qty: 30 TABLET | Refills: 0 | Status: SHIPPED | OUTPATIENT
Start: 2021-05-13 | End: 2021-06-10 | Stop reason: SDUPTHER

## 2021-05-13 NOTE — TELEPHONE ENCOUNTER
Patient is calling needing a refill of   amphetamine-dextroamphetamine (ADDERALL, 30MG,) 30 MG tablet  lisdexamfetamine (VYVANSE) 70 MG capsule     [facility] [address] [place] [address] [phone] Anayeli Simpson [phone]   [address] [place] [id number]   Phone:  [phone]  Fax:  [phone]

## 2021-05-14 DIAGNOSIS — J45.41 MODERATE PERSISTENT ASTHMA WITH EXACERBATION: ICD-10-CM

## 2021-05-14 DIAGNOSIS — J30.1 ALLERGIC RHINITIS DUE TO POLLEN, UNSPECIFIED SEASONALITY: ICD-10-CM

## 2021-05-14 RX ORDER — ALBUTEROL SULFATE 90 UG/1
2 AEROSOL, METERED RESPIRATORY (INHALATION) EVERY 6 HOURS PRN
Qty: 6.7 G | Refills: 2 | Status: SHIPPED | OUTPATIENT
Start: 2021-05-14 | End: 2021-11-03

## 2021-06-01 DIAGNOSIS — J45.41 MODERATE PERSISTENT ASTHMA WITH EXACERBATION: ICD-10-CM

## 2021-06-01 RX ORDER — PREDNISONE 20 MG/1
TABLET ORAL
Qty: 18 TABLET | Refills: 1 | Status: SHIPPED | OUTPATIENT
Start: 2021-06-01 | End: 2021-07-06

## 2021-06-01 NOTE — TELEPHONE ENCOUNTER
Last OV 4/16/2021  Next OV Not Scheduled Operative Procedure Note    Patient:  Ammon Muro  : 1988  Admit Date: 2017  Attending: Tamica Clark MD    Date of Procedure: 2017    Preprocedure Diagnosis:   1. 29 year old  at 36w6d with JOSSY: 2017   2. History of myomectomy with entry into endometrial cavity  3. Chronic hypertension  4. Rh negative  5. Obesity, BMI 38    Post Procedure Diagnosis:   1. Same    Procedure Performed:  Primary Low-Transverse  Section     Surgeon: Tamica Clark MD    Assistant: Livia Rdz DO     Anesthesiology: CRNA: Ana Rosa Abdalla CRNA  Anesthesiologist: Real Osman MD    Anesthesia:  Spinal    Estimated Blood Loss: 500cc    IV Fluids:  1300cc    UOP: 350cc    Drains/Packs/Catheters:  Olivo to Gravity     Specimens: Placenta    Intraoperative Findings:   Normal uterus, fallopian tubes, ovaries. Minimal scarring. Well healed prior myomectomy site with no scarring or adhesions. Viable male  with Apgars 8 and 9, weighing 2710g (5lb 15.6oz).     Indications for Procedure:  Ammon Muro is a 29 year old  female who presented to labor and delivery at 36w6d on 2017 with JOSSY of 2017 for scheduled repeat  section labor in the setting of a pregnancy complicated by history of myomectomy with entry into the endometrial cavity, chronic hypertension, Rh negative status, obesity, and fetal cardiac anomaly. After thorough discussion of risks, benefits, and alternatives, patient consented for primary  surgery    Description of Procedure:  After informed consent was obtained and verified, the patient was taken to the operating room where spinal anesthesia was induced without difficulty and found to be adequate. Fetal heart rate was checked and found to be normal. The patient was placed in the dorsal supine position with a leftward tilt, prepped, and draped in the normal, sterile fashion. SCDs were placed and cycling and 2g of Ancef was given for infection  prophylaxis. A cueva catheter was placed under sterile procedure and set to drain to gravity. A timeout was performed verifying correct patient, physician, and procedure. A Pfannenstiel skin incision was made with the scalpel and carried down through the underlying subcutaneous tissue to the anterior rectus fascia. The fascia was incised on each side of the midline and was extended bilaterally with Durham scissors. The superior aspect of the fascial incision was grasped with Kocher clamps, tented up, and sharply dissected off the rectus muscles. The muscles were  and the peritoneum was grasped with hemostats, elevated and incised with the Metzenbaum scissors. This incision was extended with lateral traction. The vesicouterine peritoneum was identified, grasped with forceps, and entered sharply with the Metzenbaum scissors and extended bilaterally. The bladder flap was created digitally. The uterine incision was made with the scalpel and the incision was extended with bandage scissors and superior-inferior tranction. The infant was delivered atraumatically in ELFEGO position. The umbilical cord was doubly clamped and cut and the infant was handed off to nursing staff. The placenta was delivered spontaneously with cord traction and fundal massage.      The uterus was then exteriorized. The edges of the uterine incision were grasped with Allis clamps. The interior of the uterine cavity was cleaned of clots and debris; the myometrium was felt to be thick and well healed at the prior myomectomy site. A suture of 0-Vicryl was used to close the uterine incision in a running, locked fashion. A second layer of the same suture was used in an imbricating fashion to obtain excellent hemostasis. The fallopian tubes and ovaries were inspected and found to be normal in appearance. The prior myomectomy site had minimal scarring and was well healed. The uterus was returned to the abdominal cavity. The gutters were cleared of all  clots and debris. The abdominal cavity was irrigated and hemostasis was noted. Interseed was placed along the anterior surface of the uterus and along the repaired hysterotomy.  The fascia was re-approximated using #1-Vicryl x 2. The subcutaneous tissue was re-approximated using 2-0 Vicryl interrupted stitches. The skin was closed with 4-0 Monocryl and an island dressing.     The patient tolerated the procedure well. Sponge, needle and instrument counts were correct x2. The patient was taken to the recovery room in stable condition.      Dr. Clark was present and scrubbed in for the entire procedure.      Livia Rdz DO   6/30/2017    OB/GYN  ATTENDING:    I personally saw and evaluated this patient and was present for the entire procedure.    Tamica Clark MD

## 2021-06-09 ENCOUNTER — TELEPHONE (OUTPATIENT)
Dept: PRIMARY CARE CLINIC | Age: 31
End: 2021-06-09

## 2021-06-09 DIAGNOSIS — F33.0 MAJOR DEPRESSIVE DISORDER, RECURRENT EPISODE, MILD (HCC): ICD-10-CM

## 2021-06-09 DIAGNOSIS — F90.0 ATTENTION DEFICIT HYPERACTIVITY DISORDER (ADHD), PREDOMINANTLY INATTENTIVE TYPE: ICD-10-CM

## 2021-06-09 NOTE — TELEPHONE ENCOUNTER
Patient is calling needing a refill of   amphetamine-dextroamphetamine (ADDERALL, 30MG,) 30 MG tablet  lisdexamfetamine (VYVANSE) 70 MG capsule     [facility] [address] [place] [address] [phone] Christine Vazquez [phone]   [address] [address]   Phone:  [phone]  Fax:  [phone]

## 2021-06-10 RX ORDER — DEXTROAMPHETAMINE SACCHARATE, AMPHETAMINE ASPARTATE, DEXTROAMPHETAMINE SULFATE AND AMPHETAMINE SULFATE 7.5; 7.5; 7.5; 7.5 MG/1; MG/1; MG/1; MG/1
30 TABLET ORAL
Qty: 30 TABLET | Refills: 0 | Status: SHIPPED | OUTPATIENT
Start: 2021-06-10 | End: 2021-07-08 | Stop reason: SDUPTHER

## 2021-06-10 NOTE — TELEPHONE ENCOUNTER
Script sent. Please pend medications in future  Pt needs to come to lab for his UDS. Please advise. 1. Attention deficit hyperactivity disorder (ADHD), predominantly inattentive type  2. Major depressive disorder, recurrent episode, mild (HCC)  - amphetamine-dextroamphetamine (ADDERALL, 30MG,) 30 MG tablet; Take 1 tablet by mouth Daily with lunch for 30 days. as directed. Dispense: 30 tablet; Refill: 0  - lisdexamfetamine (VYVANSE) 70 MG capsule; Take 1 capsule by mouth every morning for 30 days. Dispense: 30 capsule;  Refill: 0

## 2021-06-13 DIAGNOSIS — F33.0 MAJOR DEPRESSIVE DISORDER, RECURRENT EPISODE, MILD (HCC): ICD-10-CM

## 2021-06-14 RX ORDER — BUPROPION HYDROCHLORIDE 300 MG/1
TABLET ORAL
Qty: 30 TABLET | Refills: 5 | Status: SHIPPED | OUTPATIENT
Start: 2021-06-14 | End: 2021-12-02

## 2021-06-28 DIAGNOSIS — J45.41 MODERATE PERSISTENT ASTHMA WITH EXACERBATION: ICD-10-CM

## 2021-06-28 RX ORDER — IPRATROPIUM BROMIDE AND ALBUTEROL SULFATE 2.5; .5 MG/3ML; MG/3ML
SOLUTION RESPIRATORY (INHALATION)
Qty: 90 ML | Refills: 1 | Status: SHIPPED | OUTPATIENT
Start: 2021-06-28 | End: 2022-03-16

## 2021-07-05 DIAGNOSIS — J45.41 MODERATE PERSISTENT ASTHMA WITH EXACERBATION: ICD-10-CM

## 2021-07-06 RX ORDER — PREDNISONE 20 MG/1
TABLET ORAL
Qty: 18 TABLET | Refills: 1 | Status: SHIPPED | OUTPATIENT
Start: 2021-07-06 | End: 2021-09-22 | Stop reason: SDUPTHER

## 2021-07-08 DIAGNOSIS — F90.0 ATTENTION DEFICIT HYPERACTIVITY DISORDER (ADHD), PREDOMINANTLY INATTENTIVE TYPE: Primary | ICD-10-CM

## 2021-07-08 DIAGNOSIS — F33.0 MAJOR DEPRESSIVE DISORDER, RECURRENT EPISODE, MILD (HCC): ICD-10-CM

## 2021-07-08 RX ORDER — DEXTROAMPHETAMINE SACCHARATE, AMPHETAMINE ASPARTATE, DEXTROAMPHETAMINE SULFATE AND AMPHETAMINE SULFATE 7.5; 7.5; 7.5; 7.5 MG/1; MG/1; MG/1; MG/1
30 TABLET ORAL
Qty: 30 TABLET | Refills: 0 | Status: SHIPPED | OUTPATIENT
Start: 2021-07-08 | End: 2021-08-05 | Stop reason: SDUPTHER

## 2021-07-08 NOTE — TELEPHONE ENCOUNTER
I will refill but patient needs to get his urine drug screen. Was ordered in April and has not been done.

## 2021-08-05 ENCOUNTER — HOSPITAL ENCOUNTER (OUTPATIENT)
Age: 31
Setting detail: SPECIMEN
Discharge: HOME | End: 2021-08-05
Payer: MEDICAID

## 2021-08-05 ENCOUNTER — TELEPHONE (OUTPATIENT)
Dept: PRIMARY CARE CLINIC | Age: 31
End: 2021-08-05

## 2021-08-05 DIAGNOSIS — Z02.89 MEDICATION MANAGEMENT CONTRACT AGREEMENT: ICD-10-CM

## 2021-08-05 DIAGNOSIS — F90.0 ATTENTION DEFICIT HYPERACTIVITY DISORDER (ADHD), PREDOMINANTLY INATTENTIVE TYPE: ICD-10-CM

## 2021-08-05 DIAGNOSIS — F33.0 MAJOR DEPRESSIVE DISORDER, RECURRENT EPISODE, MILD (HCC): ICD-10-CM

## 2021-08-05 PROCEDURE — 80307 DRUG TEST PRSMV CHEM ANLYZR: CPT

## 2021-08-05 RX ORDER — DEXTROAMPHETAMINE SACCHARATE, AMPHETAMINE ASPARTATE, DEXTROAMPHETAMINE SULFATE AND AMPHETAMINE SULFATE 7.5; 7.5; 7.5; 7.5 MG/1; MG/1; MG/1; MG/1
30 TABLET ORAL
Qty: 30 TABLET | Refills: 0 | Status: SHIPPED | OUTPATIENT
Start: 2021-08-05 | End: 2021-09-10 | Stop reason: SDUPTHER

## 2021-08-05 NOTE — TELEPHONE ENCOUNTER
Pt must have UDS before next fill. Sent in refill but will not refill next time if not done. 1. Attention deficit hyperactivity disorder (ADHD), predominantly inattentive type  2. Major depressive disorder, recurrent episode, mild (HCC)  - amphetamine-dextroamphetamine (ADDERALL, 30MG,) 30 MG tablet; Take 1 tablet by mouth Daily with lunch for 30 days. as directed. Dispense: 30 tablet; Refill: 0  - lisdexamfetamine (VYVANSE) 70 MG capsule; Take 1 capsule by mouth every morning for 30 days. Dispense: 30 capsule; Refill: 0    PDMP Monitoring:    Last PDMP Don Neighbours as Reviewed [facility]):  Review User Review Instant Review Result   Laura Leticia. 8/5/2021 12:20 PM Reviewed PDMP [1]     [unfilled]  Urine Drug Screenings (1 yr)     Drug Panel-PM-HI Res-UR Interp-A  Collected: 3/11/2020  2:28 PM (Final result)    Narrative: Referred out by:  [address] [address]   Phone [phone]    Complete Results              Medication Contract and Consent for Opioid Use Documents Filed     Patient Documents       Type of Document Status Date Received Received By Description     Medication Contract [Status Missing]  RINKU STANTON Controlled Substance Med.  Agreement 4-1-14     Medication Contract Received 3/11/2020  2:46 PM MADELINE MCKEON Medication Contract

## 2021-08-05 NOTE — TELEPHONE ENCOUNTER
Patient is calling needing a refill of  lisdexamfetamine (VYVANSE) 70 MG capsule  etamine-dextroamphetamine (ADDERALL, 30MG,) 30 MG tablet    Patient states he is working 70 hours a week and has not had time to get his urine screen due to his work        Last ov 4/16  Next ov 8/10

## 2021-08-05 NOTE — TELEPHONE ENCOUNTER
Patient states he is working 70 hours a week and has not had time to get his urine screen due to his work           Last ov 4/16  Next ov 8/10

## 2021-08-10 LAB
6-ACETYLMORPHINE: NOT DETECTED
7-AMINOCLONAZEPAM: NOT DETECTED
ALPHA-OH-ALPRAZOLAM: NOT DETECTED
ALPHA-OH-MIDAZOLAM, URINE: NOT DETECTED
ALPRAZOLAM: NOT DETECTED
AMPHETAMINE: PRESENT
BARBITURATES: NOT DETECTED
BENZOYLECGONINE: NOT DETECTED
BUPRENORPHINE: NOT DETECTED
CARISOPRODOL: NOT DETECTED
CLONAZEPAM: NOT DETECTED
CODEINE: NOT DETECTED
CREATININE URINE: 78.6 MG/DL (ref 20–400)
DIAZEPAM: NOT DETECTED
DRUGS EXPECTED: NORMAL
EER PAIN MGT DRUG PANEL, HIGH RES/EMIT U: NORMAL
ETHYL GLUCURONIDE: NOT DETECTED
FENTANYL: NOT DETECTED
GABAPENTIN: NOT DETECTED
HYDROCODONE: NOT DETECTED
HYDROMORPHONE: NOT DETECTED
LORAZEPAM: NOT DETECTED
MARIJUANA METABOLITE: PRESENT
MDA: NOT DETECTED
MDEA: NOT DETECTED
MDMA URINE: NOT DETECTED
MEPERIDINE: NOT DETECTED
METHADONE: NOT DETECTED
METHAMPHETAMINE: NOT DETECTED
METHYLPHENIDATE: NOT DETECTED
MIDAZOLAM: NOT DETECTED
MORPHINE: NOT DETECTED
NALOXONE: NOT DETECTED
NORBUPRENORPHINE, FREE: NOT DETECTED
NORDIAZEPAM: NOT DETECTED
NORFENTANYL: NOT DETECTED
NORHYDROCODONE, URINE: NOT DETECTED
NOROXYCODONE: NOT DETECTED
NOROXYMORPHONE, URINE: NOT DETECTED
OXAZEPAM: NOT DETECTED
OXYCODONE: NOT DETECTED
OXYMORPHONE: NOT DETECTED
PAIN MANAGEMENT DRUG PANEL: NORMAL
PAIN MANAGEMENT DRUG PANEL: NORMAL
PCP: NOT DETECTED
PHENTERMINE: NOT DETECTED
PREGABALIN: NOT DETECTED
TAPENTADOL, URINE: NOT DETECTED
TAPENTADOL-O-SULFATE, URINE: NOT DETECTED
TEMAZEPAM: NOT DETECTED
TRAMADOL: NOT DETECTED
ZOLPIDEM: NOT DETECTED

## 2021-09-02 DIAGNOSIS — F33.0 MAJOR DEPRESSIVE DISORDER, RECURRENT EPISODE, MILD (HCC): ICD-10-CM

## 2021-09-02 DIAGNOSIS — F90.0 ATTENTION DEFICIT HYPERACTIVITY DISORDER (ADHD), PREDOMINANTLY INATTENTIVE TYPE: ICD-10-CM

## 2021-09-02 NOTE — TELEPHONE ENCOUNTER
----- Message from [facility] sent at 9/2/2021  2:47 PM EDT -----  Subject: Refill Request    QUESTIONS  Name of Medication? lisdexamfetamine (VYVANSE) 70 MG capsule  Patient-reported dosage and instructions? 70 mg 1 day  How many days do you have left? 3  Preferred Pharmacy? [id number] #[id number]  Pharmacy phone number (if available)? [phone]  Additional Information for Provider? Patient would like prescriptions sent   to [facility] at [address] [place] [address]  ---------------------------------------------------------------------------  --------------,  Name of Medication? amphetamine-dextroamphetamine (ADDERALL, 30MG,) 30 MG   tablet  Patient-reported dosage and instructions? 30 mg daily   How many days do you have left? 3  Preferred Pharmacy? [id number] #[id number]  Pharmacy phone number (if available)? [phone]  Additional Information for Provider? Patient would like prescriptions sent   to [facility] at [address] [place] [address]  ---------------------------------------------------------------------------  --------------  Santana Uruut  What is the best way for the office to contact you? OK to leave message on   voicemail  Preferred Call Back Phone Number?  [phone]

## 2021-09-10 ENCOUNTER — TELEPHONE (OUTPATIENT)
Dept: PRIMARY CARE CLINIC | Age: 31
End: 2021-09-10

## 2021-09-10 DIAGNOSIS — F90.0 ATTENTION DEFICIT HYPERACTIVITY DISORDER (ADHD), PREDOMINANTLY INATTENTIVE TYPE: ICD-10-CM

## 2021-09-10 DIAGNOSIS — F33.0 MAJOR DEPRESSIVE DISORDER, RECURRENT EPISODE, MILD (HCC): ICD-10-CM

## 2021-09-10 RX ORDER — DEXTROAMPHETAMINE SACCHARATE, AMPHETAMINE ASPARTATE, DEXTROAMPHETAMINE SULFATE AND AMPHETAMINE SULFATE 7.5; 7.5; 7.5; 7.5 MG/1; MG/1; MG/1; MG/1
30 TABLET ORAL
Qty: 30 TABLET | Refills: 0 | Status: SHIPPED | OUTPATIENT
Start: 2021-09-10 | End: 2021-10-05 | Stop reason: SDUPTHER

## 2021-09-10 NOTE — TELEPHONE ENCOUNTER
----- Message from [employer] [employer] sent at 9/10/2021  1:32 PM EDT -----  Subject: Message to Provider    QUESTIONS  Information for Provider? Patient states his Adderall 30 mg did not get   sent to his pharmacy; he takes 1 each day. He states he has none left. He   got his other medications sent over. Please send that to [place] on   [facility] @ [phone].  ---------------------------------------------------------------------------  --------------  Bruna WOLF  What is the best way for the office to contact you? OK to leave message on   voicemail  Preferred Call Back Phone Number? [phone]  ---------------------------------------------------------------------------  --------------  SCRIPT ANSWERS  Relationship to Patient?  Self

## 2021-09-10 NOTE — TELEPHONE ENCOUNTER
Refilled due to having to reschedule pt's appt 9/17 until 10/05  Pt needs repeat appropriate UDS per my last note please reay message to patient from 8/17/21 note  \"Per Dr. Christen German Pt urine drug screen abnormal for THC. While know patient uses THC, cannot use THC while on stimulant medications. Abnormal UDS last year due to medications for dental procedure. Pt can take UDS in August 23 appointment, if positive for [facility] would suggest patient find new PCP, as I will not continue adderall and vyvanse while positive for THC.  \"

## 2021-09-22 DIAGNOSIS — J45.41 MODERATE PERSISTENT ASTHMA WITH EXACERBATION: ICD-10-CM

## 2021-09-22 RX ORDER — PREDNISONE 20 MG/1
TABLET ORAL
Qty: 18 TABLET | Refills: 1 | Status: SHIPPED | OUTPATIENT
Start: 2021-09-22 | End: 2022-01-19

## 2021-09-30 DIAGNOSIS — F33.0 MAJOR DEPRESSIVE DISORDER, RECURRENT EPISODE, MILD (HCC): ICD-10-CM

## 2021-09-30 DIAGNOSIS — F90.0 ATTENTION DEFICIT HYPERACTIVITY DISORDER (ADHD), PREDOMINANTLY INATTENTIVE TYPE: ICD-10-CM

## 2021-09-30 NOTE — TELEPHONE ENCOUNTER
----- Message from Breana Chacon sent at 9/30/2021  4:18 PM EDT -----  Subject: Refill Request    QUESTIONS  Name of Medication? lisdexamfetamine (VYVANSE) 70 MG capsule  Patient-reported dosage and instructions? 70 mg once daily   How many days do you have left? 3  Preferred Pharmacy? [id number] #[id number]  Pharmacy phone number (if available)? [phone]  ---------------------------------------------------------------------------  --------------  Warden Miguel A WOLF  What is the best way for the office to contact you? OK to leave message on   voicemail, OK to respond with electronic message via [employer] portal (only   for patients who have registered [employer] account)  Preferred Call Back Phone Number?  [phone]

## 2021-10-01 NOTE — TELEPHONE ENCOUNTER
Please pend to correct pharmacy I sent this to [facility] in error already. I have resent to gloria but please call Nicole to cancel. Pt urine drug screen abnormal for THC. While know patient uses THC, cannot use THC while on stimulant medications. Abnormal UDS last year due to medications for dental procedure. Pt can take UDS at next appointment, if positive for [facility] would suggest patient find new PCP, as I will not continue adderall and vyvanse while positive for THC. 1. Attention deficit hyperactivity disorder (ADHD), predominantly inattentive type  2. Major depressive disorder, recurrent episode, mild (HCC)  - lisdexamfetamine (VYVANSE) 70 MG capsule; Take 1 capsule by mouth every morning for 30 days. Dispense: 30 capsule; Refill: 0    PDMP Monitoring:    Last PDMP Luci Manzo as Reviewed [facility]):  Review User Review Instant Review Result   Kiran Isaac. 10/1/2021  8:08 AM Reviewed PDMP [1]     [unfilled]  Urine Drug Screenings (1 yr)     Drug Panel-PM-HI Res-UR Interp-A  Collected: 8/5/2021  1:48 PM (Final result)    Narrative: Referred out by:  [facility]) - [address]   Phone [phone]    Complete Results          Drug Panel-PM-HI Res-UR Interp-A  Collected: 3/11/2020  2:28 PM (Final result)    Narrative: Referred out by:  [facility]  [address] [place] [address]   Phone [phone]    Complete Results              Medication Contract and Consent for Opioid Use Documents Filed     Patient Documents       Type of Document Status Date Received Received By Description     Medication Contract [Status Missing]  RINKU STANTON Controlled Substance Med. Agreement 4-1-14     Medication Contract Received 3/11/2020  2:46 PM Shaheen MCKEON 2  Bekah Tuttle., DO  10/1/2021

## 2021-10-05 ENCOUNTER — OFFICE VISIT (OUTPATIENT)
Dept: PRIMARY CARE CLINIC | Age: 31
End: 2021-10-05
Payer: MEDICAID

## 2021-10-05 VITALS
SYSTOLIC BLOOD PRESSURE: 128 MMHG | HEIGHT: 72 IN | WEIGHT: 200.8 LBS | DIASTOLIC BLOOD PRESSURE: 86 MMHG | HEART RATE: 94 BPM | BODY MASS INDEX: 27.2 KG/M2 | OXYGEN SATURATION: 98 % | TEMPERATURE: 98 F

## 2021-10-05 DIAGNOSIS — F33.0 MAJOR DEPRESSIVE DISORDER, RECURRENT EPISODE, MILD (HCC): ICD-10-CM

## 2021-10-05 DIAGNOSIS — Z23 NEED FOR INFLUENZA VACCINATION: ICD-10-CM

## 2021-10-05 DIAGNOSIS — F90.0 ATTENTION DEFICIT HYPERACTIVITY DISORDER (ADHD), PREDOMINANTLY INATTENTIVE TYPE: Primary | ICD-10-CM

## 2021-10-05 DIAGNOSIS — F33.41 RECURRENT MAJOR DEPRESSIVE DISORDER, IN PARTIAL REMISSION (HCC): ICD-10-CM

## 2021-10-05 DIAGNOSIS — J45.30 MILD PERSISTENT REACTIVE AIRWAY DISEASE WITHOUT COMPLICATION: ICD-10-CM

## 2021-10-05 DIAGNOSIS — R89.2 ABNORMAL DRUG SCREEN: ICD-10-CM

## 2021-10-05 DIAGNOSIS — G25.89 SCAPULAR DYSKINESIS: ICD-10-CM

## 2021-10-05 DIAGNOSIS — E66.3 OVERWEIGHT (BMI 25.0-29.9): ICD-10-CM

## 2021-10-05 DIAGNOSIS — Z02.89 MEDICATION MANAGEMENT CONTRACT AGREEMENT: ICD-10-CM

## 2021-10-05 DIAGNOSIS — M75.41 SHOULDER IMPINGEMENT, RIGHT: ICD-10-CM

## 2021-10-05 PROCEDURE — G8427 DOCREV CUR MEDS BY ELIG CLIN: HCPCS | Performed by: FAMILY MEDICINE

## 2021-10-05 PROCEDURE — 90471 IMMUNIZATION ADMIN: CPT | Performed by: FAMILY MEDICINE

## 2021-10-05 PROCEDURE — 1036F TOBACCO NON-USER: CPT | Performed by: FAMILY MEDICINE

## 2021-10-05 PROCEDURE — G8419 CALC BMI OUT NRM PARAM NOF/U: HCPCS | Performed by: FAMILY MEDICINE

## 2021-10-05 PROCEDURE — G8482 FLU IMMUNIZE ORDER/ADMIN: HCPCS | Performed by: FAMILY MEDICINE

## 2021-10-05 PROCEDURE — 90674 CCIIV4 VAC NO PRSV 0.5 ML IM: CPT | Performed by: FAMILY MEDICINE

## 2021-10-05 PROCEDURE — 99214 OFFICE O/P EST MOD 30 MIN: CPT | Performed by: FAMILY MEDICINE

## 2021-10-05 RX ORDER — DEXTROAMPHETAMINE SACCHARATE, AMPHETAMINE ASPARTATE, DEXTROAMPHETAMINE SULFATE AND AMPHETAMINE SULFATE 7.5; 7.5; 7.5; 7.5 MG/1; MG/1; MG/1; MG/1
30 TABLET ORAL
Qty: 30 TABLET | Refills: 0 | Status: SHIPPED | OUTPATIENT
Start: 2021-10-05 | End: 2021-10-28 | Stop reason: SDUPTHER

## 2021-10-05 ASSESSMENT — PATIENT HEALTH QUESTIONNAIRE - PHQ9
SUM OF ALL RESPONSES TO PHQ QUESTIONS 1-9: 2
10. IF YOU CHECKED OFF ANY PROBLEMS, HOW DIFFICULT HAVE THESE PROBLEMS MADE IT FOR YOU TO DO YOUR WORK, TAKE CARE OF THINGS AT HOME, OR GET ALONG WITH OTHER PEOPLE: 0
SUM OF ALL RESPONSES TO PHQ9 QUESTIONS 1 & 2: 0
9. THOUGHTS THAT YOU WOULD BE BETTER OFF DEAD, OR OF HURTING YOURSELF: 0
6. FEELING BAD ABOUT YOURSELF - OR THAT YOU ARE A FAILURE OR HAVE LET YOURSELF OR YOUR FAMILY DOWN: 0
SUM OF ALL RESPONSES TO PHQ QUESTIONS 1-9: 2
2. FEELING DOWN, DEPRESSED OR HOPELESS: 0
1. LITTLE INTEREST OR PLEASURE IN DOING THINGS: 0
3. TROUBLE FALLING OR STAYING ASLEEP: 1
SUM OF ALL RESPONSES TO PHQ QUESTIONS 1-9: 2
7. TROUBLE CONCENTRATING ON THINGS, SUCH AS READING THE NEWSPAPER OR WATCHING TELEVISION: 0
5. POOR APPETITE OR OVEREATING: 0
8. MOVING OR SPEAKING SO SLOWLY THAT OTHER PEOPLE COULD HAVE NOTICED. OR THE OPPOSITE, BEING SO FIGETY OR RESTLESS THAT YOU HAVE BEEN MOVING AROUND A LOT MORE THAN USUAL: 0
4. FEELING TIRED OR HAVING LITTLE ENERGY: 1

## 2021-10-05 ASSESSMENT — ANXIETY QUESTIONNAIRES
IF YOU CHECKED OFF ANY PROBLEMS ON THIS QUESTIONNAIRE, HOW DIFFICULT HAVE THESE PROBLEMS MADE IT FOR YOU TO DO YOUR WORK, TAKE CARE OF THINGS AT HOME, OR GET ALONG WITH OTHER PEOPLE: NOT DIFFICULT AT ALL
6. BECOMING EASILY ANNOYED OR IRRITABLE: 1
1. FEELING NERVOUS, ANXIOUS, OR ON EDGE: 0
3. WORRYING TOO MUCH ABOUT DIFFERENT THINGS: 0
5. BEING SO RESTLESS THAT IT IS HARD TO SIT STILL: 0
7. FEELING AFRAID AS IF SOMETHING AWFUL MIGHT HAPPEN: 0
2. NOT BEING ABLE TO STOP OR CONTROL WORRYING: 0
GAD7 TOTAL SCORE: 2
4. TROUBLE RELAXING: 1

## 2021-10-05 ASSESSMENT — ENCOUNTER SYMPTOMS
SORE THROAT: 0
RHINORRHEA: 0
COLOR CHANGE: 0
BLOOD IN STOOL: 0
DIARRHEA: 0
CONSTIPATION: 0
BACK PAIN: 0
CHEST TIGHTNESS: 1
EYE DISCHARGE: 0
ABDOMINAL PAIN: 0
WHEEZING: 1
EYE PAIN: 0
SHORTNESS OF BREATH: 1

## 2021-10-05 NOTE — PATIENT INSTRUCTIONS
See physical therapy for your shoulder    Start serevent and pulmicort step maintenance therapy    -Recommend 150 minutes of cardiovascular activity a week, or 10,000 to 15,000 steps a day, 2 days of weightbearing  -dietary wise, try to stick to your weight x 10 in calories a day  -avoid processed/refined carbohydrates (boxed/canned/frozen/fast)  -encourage healthy protein and fat, butter, avocado, egg    Repeat urine drug screen in next few months    Get flu shot today    Sign CDA today    Follow up in 3 months

## 2021-10-05 NOTE — PROGRESS NOTES
Stan Lance     1990    Consultants:   Patient Care Team:  Kely Rob., DO as PCP - General (Family Medicine)  Torsten Rob.,  as PCP - [facility] Empaneled Provider    Chief Complaint:  Chief Complaint   Patient presents with    6 Month Follow-Up    Referral - General     PT for shoulder    Shoulder Pain    Asthma    ADHD    Drug / Alcohol Assessment    Medication Check    Immunizations    Health Maintenance     HPI:  Stan Lance is a 27 y.o. male  established patient who presents for shoulder pain, reactive airway disease, ADHD/Depression, medication check, abnormal urine drug screen:    Acute on Chronic right should pain:  Issues since high school and college  Developed as competitive swimmer in high school and college  Tight past 3 months  Getting worse  Owes to working more  Tingling over right shoulder blade  Feels like its asleep  Worse with tray position at work  Discomfort with it  Worse with serving at work  Aleve PRN helps sometimes  Gets worse with use  No other remitting or provoking  No other paresthesias  Would like referral to physical therapy today    -Asthma exacerbation:  Acute recently past 2 weeks  Allergen/exercised in past   Attributes to seasonal allergies  Needed nebulizer day after 3rd  Singulair once a day  Albuterol 2-3 x a day  No wheezing/coughing/palpitations  Feels like there is an elephant sitting on his chest  Specifically would like   Serevent Diskus covered under insurance    -ADHD:  Symptoms controlled in past  Rx 10 days a part  Vyvanse  Mood and concentration good  Sleeping well  Appetite unchanged  No CP or raising heart  No vision changes or headaches  +THC in UDS 8/5/21; uses THC a couple times a week  CDA 4/30/2021;    Depression:  Feels well controlled on Wellbutrin  Under stress  Has ability to seek care  PHQ-9 Total Score: 2 (10/5/2021 10:42 AM)  Thoughts that you would be better off dead, or of hurting yourself in some way: 0 (10/5/2021 10:42 AM)    ELSIE 7 SCORE 10/5/2021 4/16/2021 3/11/2020   ELSIE-7 Total Score 2 - -   ELSIE-7 Total Score - 8 4     GERD:   Well controlled on PPI    Social Hx:  5 drinks a week, no binging  Sexually active does not want STI screening  Manager at [employer]    Overweight BMI:  Eating like trash per patient  Working more and exercise taken a hit per patient    Patient Active Problem List   Diagnosis    GERD (gastroesophageal reflux disease)    Major depression    RAD (reactive airway disease)    ADHD (attention deficit hyperactivity disorder)    Myopic astigmatism, bilateral    Overweight (BMI 25.0-29. 9)         Past Medical History:    Past Medical History:   Diagnosis Date    ADHD (attention deficit hyperactivity disorder)     Allergic rhinitis     Asthma     Depression     RAD (reactive airway disease)        Past Surgical History:  Past Surgical History:   Procedure Laterality Date    TYMPANOPLASTY Right 2019    had this past Summer    TYMPANOSTOMY TUBE PLACEMENT      x5 - also eardrum repaired (?), and had Ti's angina and separatefacial infection       Home Meds:  Prior to Visit Medications    Medication Sig Taking? Authorizing Provider   lisdexamfetamine (VYVANSE) 70 MG capsule Take 1 capsule by mouth every morning for 30 days. Yes Joon Shea, DO   predniSONE (DELTASONE) 20 MG tablet TAKE 3 TABLETS BY MOUTH DAILY X 3 DAYS, 2 TABLETS X 3 DAYS, 1 TABLET X 2 DAYS, THEN 1/2 TABLET X 2 DAYS Yes Kristine Shea, DO   amphetamine-dextroamphetamine (ADDERALL, 30MG,) 30 MG tablet Take 1 tablet by mouth Daily with lunch for 30 days. as directed. Yes Joon Shea, DO   ipratropium-albuterol (DUONEB) 0.5-2.5 (3) MG/3ML SOLN nebulizer solution INHALE 3 ML VIA NEBULIZER INTO THE LUNGS EVERY 6 HOURS AS NEEDED FOR SHORTNESS OF BREATH Yes Joon Shea, DO   buPROPion (WELLBUTRIN XL) 300 MG extended release tablet TAKE 1 TABLET BY MOUTH EVERY MORNING Yes Joon Shea, DO albuterol sulfate  (90 Base) MCG/ACT inhaler INHALE 2 PUFFS INTO THE LUNGS EVERY 6 HOURS AS NEEDED FOR WHEEZING Yes Elizabeth Drone. Sheba Gan., DO   cetirizine (ZYRTEC) 10 MG tablet Take 1 tablet by mouth daily As needed for allergies. Yes Elizabeth Drone. Sheba Gan., DO   fluticasone (FLONASE) 50 MCG/ACT nasal spray SHAKE LIQUID AND USE 1 TO 2 SPRAYS IN EACH NOSTRIL DAILY AS NEEDED FOR CONGESTION OR ALLERGIES Yes Rosemary Gan., DO   montelukast (SINGULAIR) 10 MG tablet Take 1 tablet by mouth nightly Yes Elizabeth Drone. Sheba Gan., DO   omeprazole (PRILOSEC) 40 MG delayed release capsule Take 1 capsule by mouth daily Yes Elizabeth Drone. Sheba Gan., DO   QVAR REDIHALER 40 MCG/ACT AERB inhaler INHALE 1 PUFF PO BID Yes Historical Provider, MD   Naproxen Sodium (ALEVE) 220 MG CAPS Take by mouth Yes Historical Provider, MD   albuterol (PROVENTIL) (2.5 MG/3ML) 0.083% nebulizer solution Take 3 mLs by nebulization every 6 hours as needed for Wheezing Yes Eddy Villatoro MD   calcium carbonate (TUMS) 500 MG chewable tablet Take 1 tablet by mouth daily. 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    Smoking status: Former Smoker     Packs/day: 0.75     Years: 3.00     Pack years: 2.25     Quit date: 2014     Years since quittin.6    Smokeless tobacco: Former User   Vaping Use    Vaping Use: Never used   Substance and Sexual Activity    Alcohol use:  Yes     Alcohol/week: 5.0 - 6.0 standard drinks     Types: 5 - 6 Cans of beer per week    Drug use: No    Sexual activity: Yes     Partners: Female     Birth control/protection: I.U.D., Condom   Other Topics Concern    Not on file   Social History Narrative    Not on file     Social Determinants of Health     Financial Resource Strain:     Difficulty of Paying Living Expenses:    Food Insecurity:     Worried About Running Out of Food in the Last Year:     [address] in the Last Year:    Transportation Needs:     Lack of Transportation (Medical):      Lack of Transportation (Non-Medical):    Physical Activity:     Days of Exercise per Week:     Minutes of Exercise per Session:    Stress:     Feeling of Stress :    Social Connections:     Frequency of Communication with Friends and Family:     Frequency of Social Gatherings with Friends and Family:     Attends Judaism Services:     Active Member of Clubs or Organizations:     Attends Club or Organization Meetings:     Marital Status:    Intimate Partner Violence:     Fear of Current or Ex-Partner:     Emotionally Abused:     Physically Abused:     Sexually Abused:          Health Maintenance Completed:  Health Maintenance   Topic Date Due    Hepatitis C screen  Never done    Varicella vaccine (1 of 2 - 2-dose childhood series) Never done    Pneumococcal 0-64 years Vaccine (1 of 2 - PPSV23) Never done    DTaP/Tdap/Td vaccine (6 - Tdap) 10/16/2001    HIV screen  Never done    Flu vaccine (1) 09/01/2021    Hib vaccine  Completed    Meningococcal (ACWY) vaccine  Completed    COVID-19 Vaccine  Completed    Hepatitis A vaccine  Aged Out    Hepatitis B vaccine  Aged [employer] History   Administered Date(s) Administered    COVID-19, Zebtab, PF, 30mcg/0.3mL 03/03/2021, 03/24/2021, 09/22/2021    DTaP 1990, 03/22/1991, 05/01/1991, 05/29/1992, 07/29/1996    Hib, unspecified 05/29/1992    Influenza Vaccine, unspecified formulation 12/15/2005, 11/02/2006, 10/15/2007, 10/31/2008    Influenza Virus Vaccine 09/26/2003, 11/02/2006, 10/15/2007, 10/31/2008    Influenza Whole 11/08/1995, 12/08/1995, 11/14/1996, 11/04/1997, 01/10/2000    Influenza, Jose Quinnn, IM, PF (6 mo and older Fluzone, Flulaval, Fluarix, and 3 yrs and older Afluria) 11/05/2018, 10/14/2019, 11/02/2020    MMR 02/02/1992, 09/04/2002    Meningococcal MCV4P (Menactra) 10/15/2007    Meningococcal MPSV4 (Menomune) 10/15/2007    Polio IPV (IPOL) 05/29/1992, 07/29/1996    Polio OPV 1990, 03/22/1991, 05/01/1992     Review of Systems   Constitutional: Negative for chills, fever and unexpected weight change. HENT: Negative for congestion, rhinorrhea and sore throat. Eyes: Negative for pain, discharge and visual disturbance. Respiratory: Positive for chest tightness, shortness of breath and wheezing. Cardiovascular: Positive for palpitations. Negative for chest pain and leg swelling. Gastrointestinal: Negative for abdominal pain, blood in stool, constipation and diarrhea. Endocrine: Negative for polyuria. Genitourinary: Negative for dysuria and flank pain. Musculoskeletal: Positive for arthralgias. Negative for back pain and neck pain. Right shoulder pain   Skin: Negative for color change, rash and wound. Allergic/Immunologic: Negative for environmental allergies, food allergies and immunocompromised state. Neurological: Positive for numbness (righ scapula). Negative for dizziness, speech difficulty, weakness, light-headedness and headaches. Hematological: Negative for adenopathy. Does not bruise/bleed easily. Psychiatric/Behavioral: Positive for decreased concentration. Negative for dysphoric mood and sleep disturbance. The patient is not nervous/anxious. Physical Exam:   Vitals:    10/05/21 1037   BP: 128/86   Pulse: 94   Temp: 98 °F (36.7 °C)   TempSrc: Temporal   SpO2: 98%   Weight: 200 lb 12.8 oz (91.1 kg)   Height: 5' 11.6\" (1.819 m)     Body mass index is 27.54 kg/m².     Wt Readings from Last 3 Encounters:   10/05/21 200 lb 12.8 oz (91.1 kg)   04/16/21 198 lb (89.8 kg)   11/02/20 199 lb (90.3 kg)       BP Readings from Last 3 Encounters:   10/05/21 128/86   04/16/21 112/74   11/02/20 120/60       Physical Exam  Constitutional:       Appearance: Normal appearance. He is not ill-appearing. HENT:      Head: Normocephalic and atraumatic. Eyes:      Extraocular Movements: Extraocular movements intact. Cardiovascular:      Rate and Rhythm: Normal rate and regular rhythm. Heart sounds: Normal heart sounds. No murmur heard. No friction rub. No gallop. Pulmonary:      Effort: Pulmonary effort is normal.      Breath sounds: Decreased air movement present. No decreased breath sounds, wheezing, rhonchi or rales. Neurological:      General: No focal deficit present. Mental Status: He is alert and oriented to person, place, and time. Mental status is at baseline. Psychiatric:         Mood and Affect: Mood normal.         Behavior: Behavior normal.         Thought Content: Thought content normal.        Right Shoulder Examination:    Inspection: no erythema, edema, ecchymoses  Palpation: non tender to palpation    Provocative Test Positive Negative Not indicated   Cross Arm Adduction Test [x] [] []   Apprehension Sign [x] [] []   Neer Sign [] [x] []   Sequeira Impingement Sign [] [x] []   Yergason test [] [x] []   OBrights test [] [x] []     Flexion  90, extension 45, abduction 180, adduction 45, internal rotation 55 to L4 belt line,external rotation 45    Lab Review:   Urine drug screen + THC 8/5/21  Pt agrees to cessation and repeat urine drug screen in next 60-90 days     Assessment/Plan:  Stoney Coleman is a 27-year-old male with history of reactive airway disease and asthma, ADHD and depression, overweight BMI, shoulder pain. Presents today to address each of these problems. Acute on chronic shoulder pain on the right side with numbness and tingling in the right scapular area. History and exam concerning for scapular dyskinesis versus impingement syndrome. Patient open to seeing physical therapy and continuing anti-inflammatory. Patient with recent asthma exacerbation and appears to be having more frequently over the last 12 to 18 months of knowing the patient. Has taken oral steroids in the past for exacerbations. Will step up therapy with LABA and ICS to help prevent future exacerbations and optimize reactive airway disease/asthma treatment. Depressive symptoms controlled on Wellbutrin. Concentration deficit treated to goal with Vyvanse in the morning and Adderall in the afternoon. Patient had abnormal urine drug screen with THC and agrees to marijuana cessation and repeat urine drug screen in the next 6090 days or cannot continue stimulant prescribing to patient. Patient may look into getting a medical marijuana card as he uses recreational marijuana a few times a week. Patient overweight BMI and encouraged with diet and exercise. Patient okay with his flu shot today. Has declined hepatitis C, HIV, other vaccines in the past and we can discuss further at next visit. 1. Attention deficit hyperactivity disorder (ADHD), predominantly inattentive type  2. Medication management contract agreement  3. Major depressive disorder, recurrent episode, mild ([facility] [address]  4. Recurrent major depressive disorder, in partial remission (HCC)  ADHD and depressive symptoms controlled, however THC in UDS  UDS abnormal with THC; pt will consider medical marijuana card; agrees to cessation and repeat urine drug screen before next appointment  Continue Wellbuttrin  300 mg daily  Continue Vvyanse 70 mg am and adderall 30 mg afternoon  - Drug Panel-PM-HI Res-UR Interp-A; Future  - amphetamine-dextroamphetamine (ADDERALL, 30MG,) 30 MG tablet; Take 1 tablet by mouth Daily with lunch for 30 days. as directed. Dispense: 30 tablet; Refill: 0    6.  Mild persistent reactive airway disease without complication  Not well controlled; exacerbation 2 weeks ago; takes oral prednisone when this happens  Step up therapy with laba, ICS; combination medications cost prohibitive in past  Albuterol inhaler PRN  singulair 10 mg daily  duonebs PRN  Add LABA and ICS today  - salmeterol (SEREVENT DISKUS) 50 MCG/DOSE diskus inhaler; Inhale 1 puff into the lungs 2 times daily  Dispense: 1 each; Refill: 3  - budesonide (PULMICORT FLEXHALER) 90 MCG/ACT AEPB inhaler; Inhale 2 puffs into the lungs 2 times daily  Dispense: 1 each; Refill: 3    7. Scapular dyskinesis  8. Shoulder impingement, right  Chronic shoulder pain/numbness in scapular area; exam concerning for impingement vs scaplular dyskinesis; naproxen with food PRN, start PT  - [facility] - [facility]    9. Overweight (BMI 25.0-29.9)  -Recommend 150 minutes of cardiovascular activity a week, or 10,000 to 15,000 steps a day, 2 days of weightbearing  -dietary wise, try to stick to your weight x 10 in calories a day  -avoid processed/refined carbohydrates (boxed/canned/frozen/fast)  -encourage healthy protein and fat, butter, avocado, egg    10. Need for influenza vaccination  - INFLUENZA, MDCK QUADV, 2 YRS AND OLDER, IM, PF, PREFILL SYR OR SDV, 0.5ML (FLUCELVAX QUADV, PF)      Health Maintenance Due:  Health Maintenance Due   Topic Date Due    Hepatitis C screen  Never done    Varicella vaccine (1 of 2 - 2-dose childhood series) Never done    Pneumococcal 0-64 years Vaccine (1 of 2 - PPSV23) Never done    DTaP/Tdap/Td vaccine (6 - Tdap) 10/16/2001    HIV screen  Never done    Flu vaccine (1) 09/01/2021      Health Maintenance:  HIV Screen: (15-65 yr old, and all pregnant patients): pt declines in past  Hep C Screen: (18-79 yr old): pt declined in past    Immunizations:  Flu vaccine today  Recommend Tdap, PCV23, vaccines, varicella titer next OV    Return in about 3 months (around 1/5/2022).      Spent 30-39 minutes of face to face interaction with patient counseling on diagnoses and treatment plan; including but not limited to pre visit planning, chart/lab review, new orders, instructions, charting    EMR Dragon/transcription disclaimer:  Much of this encounter note is electronic transcription/translation of spoken language to printed texts. The electronic translation of spoken language may be erroneous, or at times, nonsensical words or phrases may be inadvertently transcribed. Although I have reviewed the note for such errors, some may still exist.       Charlee Hanks.  Harpreet Ingram., DO  10/5/2021

## 2021-10-05 NOTE — LETTER
lung disease. Overdose or dangerous interactions with alcohol and other medications may occur, leading to death. Hyperalgesia may develop, which means patients receiving opioids for the treatment of pain may become more sensitive to certain painful stimuli, and in some cases, experience pain from ordinarily non-painful stimuli. Women between the ages of 14-53 who could become pregnant should carefully weigh the risks and benefits of opioids with their physicians, as these medications increase the risk of pregnancy complications, including miscarriage,  delivery and stillbirth. It is also possible for babies to be born addicted to opioids. Opioid dependence withdrawal symptoms may include; feelings of uneasiness, increased pain, irritability, belly pain, diarrhea, sweats and goose-flesh. Benzodiazepines and non-benzodiazepine sleep medications: These medications can lead to problems such as addiction/dependence, sedation, fatigue, lightheadedness, dizziness, incoordination, falls, depression, hallucinations, and impaired judgment, memory and concentration. The ability to drive and operate machinery may also be affected. Abnormal sleep-related behaviors have been reported, including sleepwalking, driving, making telephone calls, eating, or having sex while not fully awake. These medications can suppress breathing and worsen sleep apnea, particularly when combined with alcohol or other sedating medications, potentially leading to death. Dependence withdrawal symptoms may include tremors, anxiety, hallucinations and seizures. Stimulants:  Common adverse effects include addiction/dependence, increased blood  pressure and heart rate, decreased appetite, nausea, involuntary weight loss, insomnia,                                                                                                                     Initials:_______   irritability, and headaches.   These risks may increase when these medications are combined with other stimulants, such as caffeine pills or energy drinks, certain weight loss supplements and oral decongestants. Dependence withdrawal symptoms may include depressed mood, loss of interest, suicidal thoughts, anxiety, fatigue, appetite changes and agitation. Testosterone replacement therapy:  Potential side effects include increased risk of stroke and heart attack, blood clots, increased blood pressure, increased cholesterol, enlarged prostate, sleep apnea, irritability/aggression and other mood disorders, and decreased fertility. I agree and understand that I and my prescriber have the following rights and responsibilities regarding my treatment plan:     1. MY RIGHTS:  To be informed of my treatment and medication plan. To be an active participant in my health and wellbeing. 2. MY RESPONSIBILITY AND UNDERSTANDING FOR USE OF MEDICATIONS   I will take medications at the dose and frequency as directed. For my safety, I will not increase or change how I take my medications without the recommendation of my healthcare provider.  I will actively participate in any program recommended by my provider which may improve function, including social, physical, psychological programs.  I will not take my medications with alcohol or other drugs not prescribed to me. I understand that drinking alcohol with my medications increases the chances of side effects, including reduced breathing rate and could lead to personal injury when operating machinery.  I understand that if I have a history of substance use disorders, including alcohol or other illicit drugs, that I may be at increased risk of addiction to my medications.  I agree to notify my provider immediately if I should become pregnant so that my treatment plan can be adjusted.    I agree and understand that I shall only receive controlled substance medications from the prescriber that signed this agreement unless there is written agreement among other prescribers of controlled substances outlining the responsibility of the medications being prescribed.  I understand that the if the controlled medication is not helping to achieve goals, the dosage may be tapered and no longer prescribed. 3. MY RESPONSIBILITY FOR COMMUNICATION / PRESCRIPTION RENEWALS   I agree that all controlled substance medications that I take will be prescribed only by my provider. If another healthcare provider prescribes me medication in an emergency, I will notify my provider within seventy-two (72) hours.  I will arrange for refills at the prescribed interval ONLY during regular office hours. I will not ask for refills earlier than agreed, after-hours, on holidays or weekends. Refills may take up to 72 hours for processing and prescriptions to reach the pharmacy.  I will inform my other health care providers that I am taking these medications and of the existence of this [id number]. In the event of an emergency, I will provide the same information to the emergency department prescribers.  I will keep my provider updated on the pharmacy I am using for controlled medication prescription filling. Initials:_______  4. MY RESPONSIBILITY FOR PROTECTING MEDICATIONS   I will protect my prescriptions and medications. I understand that lost or misplaced prescriptions will not be replaced.  I will keep medications only for my own use and will not share them with others. I will keep all medications away from children.  I agree that if my medications are adjusted or discontinued, I will properly dispose of any remaining medications. I understand that I will be required to dispose of any remaining controlled medications as, directed by my prescriber, prior to being provided with any prescriptions for other controlled medications.   Medication drop box locations can be found at: controlled substance medications, in their original bottles, to all of my scheduled appointments. In addition, my provider may ask me to come to the practice at any time for a random pill count. 8. TERMINATION OF THIS AGREEMENT  For my safety, my prescriber has the right to stop prescribing controlled substance medications and may end this agreement. Initials:_______   Conditions that may result in termination of this agreement:  a. I do not show any improvement in pain, or my activity has not improved. b. I develop rapid tolerance or loss of improvement, as described in my treatment plan.  c. I develop significant side effects from the medication. d. My behavior is not consistent with the responsibilities outlined above, thereby causing safety concerns to continue prescribing controlled substance medications. e. I fail to follow the terms of this agreement. f. Other:____________________________       UNDERSTANDING THIS MEDICATION AGREEMENT:    I have read the above and have had all my questions answered. For chronic disease management, I know that my symptoms can be managed with many types of treatments. A chronic medication trial may be part of my treatment, but I must be an active participant in my care. Medication therapy is only one part of my symptom management plan. In some cases, there may be limited scientific evidence to support the chronic use of certain medications to improve symptoms and daily function. Furthermore, in certain circumstances, there may be scientific information that suggests that the use of chronic controlled substances may worsen my symptoms and increase my risk of unintentional death directly related to this medication therapy. I know that if my provider feels my risk from controlled medications is greater than my benefit, I will have my controlled substance medication(s) compassionately lowered or removed altogether.      I further agree to allow this office to contact my HIPAA contact if there are concerns about my safety and use of the controlled medications. I have agreed to use the prescribed controlled substance medications to me as instructed by my provider and as stated in this Medication Agreement. My initial on each page and my signature below shows that I have read each page and I have had the opportunity to ask questions with answers provided by my provider.     Patient Name (Printed): _____________________________________  Patient Signature:  ______________________   Date: _____________    Prescriber Name (Printed): ___________________________________  Prescriber Signature: _____________________  Date: _____________

## 2021-10-19 ENCOUNTER — TELEPHONE (OUTPATIENT)
Dept: PRIMARY CARE CLINIC | Age: 31
End: 2021-10-19

## 2021-10-19 NOTE — TELEPHONE ENCOUNTER
[phone] is the united number     Due to their insurance the PT will need PA done in order to get their evaluation done with the physical therapy we referred.

## 2021-10-28 ENCOUNTER — TELEPHONE (OUTPATIENT)
Dept: PRIMARY CARE CLINIC | Age: 31
End: 2021-10-28

## 2021-10-28 DIAGNOSIS — F33.0 MAJOR DEPRESSIVE DISORDER, RECURRENT EPISODE, MILD (HCC): ICD-10-CM

## 2021-10-28 DIAGNOSIS — F90.0 ATTENTION DEFICIT HYPERACTIVITY DISORDER (ADHD), PREDOMINANTLY INATTENTIVE TYPE: ICD-10-CM

## 2021-10-28 RX ORDER — DEXTROAMPHETAMINE SACCHARATE, AMPHETAMINE ASPARTATE, DEXTROAMPHETAMINE SULFATE AND AMPHETAMINE SULFATE 7.5; 7.5; 7.5; 7.5 MG/1; MG/1; MG/1; MG/1
30 TABLET ORAL
Qty: 30 TABLET | Refills: 0 | Status: SHIPPED | OUTPATIENT
Start: 2021-10-28 | End: 2021-12-08 | Stop reason: SDUPTHER

## 2021-10-28 NOTE — TELEPHONE ENCOUNTER
lisdexamfetamine (VYVANSE) 70 MG capsule  amphetamine-dextroamphetamine (ADDERALL, 30MG,) 30 MG tablet    PT needs refills of these meds. Pharmacy confirmed.

## 2021-10-29 ENCOUNTER — HOSPITAL ENCOUNTER (EMERGENCY)
Age: 31
Discharge: HOME | End: 2021-10-29
Attending: EMERGENCY MEDICINE
Payer: MEDICAID

## 2021-10-29 ENCOUNTER — TELEPHONE (OUTPATIENT)
Dept: PRIMARY CARE CLINIC | Age: 31
End: 2021-10-29

## 2021-10-29 ENCOUNTER — APPOINTMENT (OUTPATIENT)
Dept: GENERAL RADIOLOGY | Age: 31
End: 2021-10-29
Payer: MEDICAID

## 2021-10-29 VITALS
HEART RATE: 92 BPM | DIASTOLIC BLOOD PRESSURE: 86 MMHG | SYSTOLIC BLOOD PRESSURE: 140 MMHG | OXYGEN SATURATION: 99 % | TEMPERATURE: 98.1 F | RESPIRATION RATE: 22 BRPM

## 2021-10-29 DIAGNOSIS — F90.0 ATTENTION DEFICIT HYPERACTIVITY DISORDER (ADHD), PREDOMINANTLY INATTENTIVE TYPE: ICD-10-CM

## 2021-10-29 DIAGNOSIS — J06.9 ACUTE UPPER RESPIRATORY INFECTION: ICD-10-CM

## 2021-10-29 DIAGNOSIS — F33.0 MAJOR DEPRESSIVE DISORDER, RECURRENT EPISODE, MILD (HCC): ICD-10-CM

## 2021-10-29 DIAGNOSIS — J45.41 MODERATE PERSISTENT ASTHMA WITH EXACERBATION: Primary | ICD-10-CM

## 2021-10-29 PROCEDURE — 99283 EMERGENCY DEPT VISIT LOW MDM: CPT

## 2021-10-29 PROCEDURE — 71046 X-RAY EXAM CHEST 2 VIEWS: CPT

## 2021-10-29 RX ORDER — AZITHROMYCIN 250 MG/1
250 TABLET, FILM COATED ORAL SEE ADMIN INSTRUCTIONS
Qty: 6 TABLET | Refills: 0 | Status: SHIPPED | OUTPATIENT
Start: 2021-10-29 | End: 2021-11-03

## 2021-10-29 RX ORDER — PREDNISONE 10 MG/1
TABLET ORAL
Qty: 14 TABLET | Refills: 0 | Status: SHIPPED | OUTPATIENT
Start: 2021-10-29 | End: 2022-07-15 | Stop reason: ALTCHOICE

## 2021-10-29 RX ORDER — AZITHROMYCIN 250 MG/1
250 TABLET, FILM COATED ORAL SEE ADMIN INSTRUCTIONS
Qty: 6 TABLET | Refills: 0 | Status: SHIPPED | OUTPATIENT
Start: 2021-10-29 | End: 2021-10-29 | Stop reason: SDUPTHER

## 2021-10-29 NOTE — TELEPHONE ENCOUNTER
lianna is calling needing his lisdexamfetamine (VYVANSE) 70 MG capsule to be sent to a different pharmacy the original one is out of stock for at least a week and needs is sent to [facility] [address] [facility], [address] [phone]  .     Please advise   Elizabeth Freeman [phone] (home)

## 2021-10-30 NOTE — ED PROVIDER NOTES
use: No    Sexual activity: Yes     Partners: Female     Birth control/protection: I.U.D., Condom   Other Topics Concern    Not on file   Social History Narrative    Not on file     Social Determinants of Health     Financial Resource Strain:     Difficulty of Paying Living Expenses:    Food Insecurity:     Worried About Running Out of Food in the Last Year:     [address] in the Last Year:    Transportation Needs:     Lack of Transportation (Medical):  Lack of Transportation (Non-Medical):    Physical Activity:     Days of Exercise per Week:     Minutes of Exercise per Session:    Stress:     Feeling of Stress :    Social Connections:     Frequency of Communication with Friends and Family:     Frequency of Social Gatherings with Friends and Family:     Attends [employer] Services:     Active Member of Clubs or Organizations:     Attends Club or Organization Meetings:     Marital Status:    Intimate Partner Violence:     Fear of Current or Ex-Partner:     Emotionally Abused:     Physically Abused:     Sexually Abused:      No current facility-administered medications for this encounter. Current Outpatient Medications   Medication Sig Dispense Refill    predniSONE (DELTASONE) 10 MG tablet Take 4 tabs daily for 2 days,   Then 2 tabs daily for 2 days,   Then 1 tab daily for 2 days. 14 tablet 0    azithromycin (ZITHROMAX) 250 MG tablet Take 1 tablet by mouth See Admin Instructions for 5 days 500mg on day 1 followed by 250mg on days 2 - 5 6 tablet 0    lisdexamfetamine (VYVANSE) 70 MG capsule Take 1 capsule by mouth every morning for 30 days. 30 capsule 0    amphetamine-dextroamphetamine (ADDERALL, 30MG,) 30 MG tablet Take 1 tablet by mouth Daily with lunch for 30 days. as directed.  30 tablet 0    salmeterol (SEREVENT DISKUS) 50 MCG/DOSE diskus inhaler Inhale 1 puff into the lungs 2 times daily 1 each 3    budesonide (PULMICORT FLEXHALER) 90 MCG/ACT AEPB inhaler Inhale 2 puffs into the lungs 2 times daily 1 each 3    predniSONE (DELTASONE) 20 MG tablet TAKE 3 TABLETS BY MOUTH DAILY X 3 DAYS, 2 TABLETS X 3 DAYS, 1 TABLET X 2 DAYS, THEN 1/2 TABLET X 2 DAYS 18 tablet 1    ipratropium-albuterol (DUONEB) 0.5-2.5 (3) MG/3ML SOLN nebulizer solution INHALE 3 ML VIA NEBULIZER INTO THE LUNGS EVERY 6 HOURS AS NEEDED FOR SHORTNESS OF BREATH 90 mL 1    buPROPion (WELLBUTRIN XL) 300 MG extended release tablet TAKE 1 TABLET BY MOUTH EVERY MORNING 30 tablet 5    albuterol sulfate  (90 Base) MCG/ACT inhaler INHALE 2 PUFFS INTO THE LUNGS EVERY 6 HOURS AS NEEDED FOR WHEEZING 6.7 g 2    cetirizine (ZYRTEC) 10 MG tablet Take 1 tablet by mouth daily As needed for allergies. 90 tablet 3    fluticasone (FLONASE) 50 MCG/ACT nasal spray SHAKE LIQUID AND USE 1 TO 2 SPRAYS IN EACH NOSTRIL DAILY AS NEEDED FOR CONGESTION OR ALLERGIES 3 Bottle 3    montelukast (SINGULAIR) 10 MG tablet Take 1 tablet by mouth nightly 30 tablet 12    omeprazole (PRILOSEC) 40 MG delayed release capsule Take 1 capsule by mouth daily 90 capsule 3    QVAR REDIHALER 40 MCG/ACT AERB inhaler INHALE 1 PUFF PO BID  3    Naproxen Sodium (ALEVE) 220 MG CAPS Take by mouth      albuterol (PROVENTIL) (2.5 MG/3ML) 0.083% nebulizer solution Take 3 mLs by nebulization every 6 hours as needed for Wheezing 25 each 2    calcium carbonate (TUMS) 500 MG chewable tablet Take 1 tablet by mouth daily. Allergies   Allergen Reactions    Advair [Fluticasone-Salmeterol]      palpitations    Erythromycin Rash       REVIEW OF SYSTEMS  10 systems reviewed, pertinent positives per HPI otherwise noted to be negative. PHYSICAL EXAM  BP (!) 140/86   Pulse 92   Temp 98.1 °F (36.7 °C)   Resp 22   SpO2 99%   GENERAL APPEARANCE: Awake and alert. Cooperative. No acute distress. HEAD: Normocephalic. Atraumatic. EYES: PERRL. EOM's grossly intact. .  ENT: Mucous membranes are moist.   NECK: Supple, trachea midline.    HEART: Minimal tachycardia normal S1, S2. No murmurs, rubs or gallops. LUNGS: Respirations unlabored. Diffuse wheezing. No rhonchi or rales. ABDOMEN: Soft. Non-distended. Non-tender. No guarding or rebound. Normal Bowel sounds. EXTREMITIES: No peripheral edema. MAEE. No acute deformities. SKIN: Warm and dry. No acute rashes. NEUROLOGICAL: Alert and oriented X 3. CN II-XII intact. No gross facial drooping. Strength 5/5, sensation intact. No pronator drift. Normal coordination. Gait normal.   PSYCHIATRIC: Normal mood and affect. LABS  I have reviewed all labs for this visit. No results found for this visit on 10/29/21. RADIOLOGY  X-RAYS:  I have reviewed radiologic plain film image(s). ALL OTHER NON-PLAIN FILM IMAGES SUCH AS CT, ULTRASOUND AND MRI HAVE BEEN READ BY THE RADIOLOGIST. XR CHEST (2 VW)   Final Result      No acute pulmonary disease. Rechecks: Physical assessment performed. Patient offered nebulizers but declined as he had taken one shortly prior to arrival.    Recheck of heart rate: 96      ED COURSE/MDM  Patient seen and evaluated. Old records reviewed. Labs and imaging reviewed and results discussed with patient. Patient was stable throughout stay in the emergency department. . Patient was reassessed as noted above . Chest x-ray stable. No evidence of pneumonia. No acute pathology was noted and pt is safe for discharge home to follow up with PCP. Caitlin Callahan Plan of care discussed with patient and family. Patient and family in agreement with plan. Patient was given scripts for the following medications. I counseled patient how to take these medications. Discharge Medication List as of 10/29/2021 11:14 PM      START taking these medications    Details   ! ! predniSONE (DELTASONE) 10 MG tablet Take 4 tabs daily for 2 days,   Then 2 tabs daily for 2 days,   Then 1 tab daily for 2 days. , Disp-14 tablet, R-0Print       !! - Potential duplicate medications found. Please discuss with provider. CLINICAL IMPRESSION  1. Moderate persistent asthma with exacerbation    2. Acute upper respiratory infection        Blood pressure (!) 140/86, pulse 92, temperature 98.1 °F (36.7 °C), resp. rate 22, SpO2 99 %. DISPOSITION  Lonny Treadwell was discharged to home in stable condition.         Shante Bucio,   10/30/21 0107

## 2021-10-30 NOTE — ED NOTES
Patient states that he feels a little better. Shaky feeling is gone. Given d/c instructions with return verbalization including two Rx, Emphasis on f/u, to return with worsening s/s. Feels ok to drive home. Patient ambulated to lobby with steady gait, resp easy with ambulation.      Jerman Davidson RN  10/29/21 0293

## 2021-10-30 NOTE — ED TRIAGE NOTES
Patient c/o feeling SOB. Went to urgent care yesterday, started on steroids. Not feeling any better. Has been wheezing, did a duoneb on way here, has a portable machine. NR after inhaler, feels shaky after treatment.  Resp 28

## 2021-10-31 NOTE — TELEPHONE ENCOUNTER
Script sent    1. Attention deficit hyperactivity disorder (ADHD), predominantly inattentive type  2. Major depressive disorder, recurrent episode, mild (HCC)  - lisdexamfetamine (VYVANSE) 70 MG capsule; Take 1 capsule by mouth every morning for 30 days. Dispense: 30 capsule; Refill: 0    PDMP Monitoring:    Last PDMP [facility] SYSTEM as Reviewed [facility]):  Review User Review Instant Review Result   Jeana Cerda. 10/31/2021  1:41 PM Reviewed PDMP [1]     [unfilled]  Urine Drug Screenings (1 yr)     Drug Panel-PM-HI Res-UR Interp-A  Collected: 8/5/2021  1:48 PM (Final result)    Narrative: Referred out by:  [address]   Phone [phone]    Complete Results          Drug Panel-PM-HI Res-UR Interp-A  Collected: 3/11/2020  2:28 PM (Final result)    Narrative: Referred out by:  [facility]  [address] [place] [address]   Phone [phone]    Complete Results              Medication Contract and Consent for Opioid Use Documents Filed     Patient Documents       Type of Document Status Date Received Received By Description     Medication Contract [Status Missing]  RINKU STANTON Controlled Substance Med. Agreement 4-1-14     Medication Contract Received 3/11/2020  2:46 PM [address] DO  10/31/2021

## 2021-11-03 DIAGNOSIS — J30.1 ALLERGIC RHINITIS DUE TO POLLEN, UNSPECIFIED SEASONALITY: ICD-10-CM

## 2021-11-03 DIAGNOSIS — J45.41 MODERATE PERSISTENT ASTHMA WITH EXACERBATION: ICD-10-CM

## 2021-11-03 RX ORDER — ALBUTEROL SULFATE 90 UG/1
2 AEROSOL, METERED RESPIRATORY (INHALATION) EVERY 6 HOURS PRN
Qty: 6.7 G | Refills: 2 | Status: SHIPPED | OUTPATIENT
Start: 2021-11-03 | End: 2022-03-16

## 2021-11-29 ENCOUNTER — TELEPHONE (OUTPATIENT)
Dept: PRIMARY CARE CLINIC | Age: 31
End: 2021-11-29

## 2021-11-29 DIAGNOSIS — F33.0 MAJOR DEPRESSIVE DISORDER, RECURRENT EPISODE, MILD (HCC): ICD-10-CM

## 2021-11-29 DIAGNOSIS — F90.0 ATTENTION DEFICIT HYPERACTIVITY DISORDER (ADHD), PREDOMINANTLY INATTENTIVE TYPE: ICD-10-CM

## 2021-11-29 RX ORDER — DEXTROAMPHETAMINE SACCHARATE, AMPHETAMINE ASPARTATE, DEXTROAMPHETAMINE SULFATE AND AMPHETAMINE SULFATE 7.5; 7.5; 7.5; 7.5 MG/1; MG/1; MG/1; MG/1
30 TABLET ORAL
Qty: 30 TABLET | Refills: 0 | OUTPATIENT
Start: 2021-11-29 | End: 2021-12-29

## 2021-11-29 NOTE — TELEPHONE ENCOUNTER
Refill Request - Controlled Substance    Last Seen Department: 10/5/2021  Last Seen by PCP: 10/5/2021    Last Written:   Adderall 10/28/2021  Vyvanse 10/31/2021     Last UDS: 8/5/2021    Med Agreement Signed On: 3/11/2020    Next Appointment: Visit date not found      Requested Prescriptions     Pending Prescriptions Disp Refills    amphetamine-dextroamphetamine (ADDERALL, 30MG,) 30 MG tablet 30 tablet 0     Sig: Take 1 tablet by mouth Daily with lunch for 30 days. as directed.  lisdexamfetamine (VYVANSE) 70 MG capsule 30 capsule 0     Sig: Take 1 capsule by mouth every morning for 30 days.

## 2021-11-29 NOTE — TELEPHONE ENCOUNTER
Pt needs urine drug screen. Not do for refill until 12/4/21  Please have patient go do this.  Can fill after this results

## 2021-11-29 NOTE — TELEPHONE ENCOUNTER
lisdexamfetamine (VYVANSE) 70 MG capsule    amphetamine-dextroamphetamine (ADDERALL, 30MG,) 30 MG tablet      Pharmacy: [facility] on file.     PT needs refills

## 2021-12-01 DIAGNOSIS — Z02.89 MEDICATION MANAGEMENT CONTRACT AGREEMENT: ICD-10-CM

## 2021-12-01 DIAGNOSIS — F90.0 ATTENTION DEFICIT HYPERACTIVITY DISORDER (ADHD), PREDOMINANTLY INATTENTIVE TYPE: ICD-10-CM

## 2021-12-01 DIAGNOSIS — F33.0 MAJOR DEPRESSIVE DISORDER, RECURRENT EPISODE, MILD (HCC): ICD-10-CM

## 2021-12-02 RX ORDER — BUPROPION HYDROCHLORIDE 300 MG/1
TABLET ORAL
Qty: 30 TABLET | Refills: 5 | Status: SHIPPED | OUTPATIENT
Start: 2021-12-02 | End: 2022-05-09 | Stop reason: SDUPTHER

## 2021-12-05 LAB
6-ACETYLMORPHINE: NOT DETECTED
7-AMINOCLONAZEPAM: NOT DETECTED
ALPHA-OH-ALPRAZOLAM: NOT DETECTED
ALPHA-OH-MIDAZOLAM, URINE: NOT DETECTED
ALPRAZOLAM: NOT DETECTED
AMPHETAMINE: NOT DETECTED
BARBITURATES: NOT DETECTED
BENZOYLECGONINE: NOT DETECTED
BUPRENORPHINE: NOT DETECTED
CARISOPRODOL: NOT DETECTED
CLONAZEPAM: NOT DETECTED
CODEINE: NOT DETECTED
CREATININE URINE: 75.7 MG/DL (ref 20–400)
DIAZEPAM: NOT DETECTED
DRUGS EXPECTED: NORMAL
EER PAIN MGT DRUG PANEL, HIGH RES/EMIT U: NORMAL
ETHYL GLUCURONIDE: NOT DETECTED
FENTANYL: NOT DETECTED
GABAPENTIN: NOT DETECTED
HYDROCODONE: NOT DETECTED
HYDROMORPHONE: NOT DETECTED
LORAZEPAM: NOT DETECTED
MARIJUANA METABOLITE: NOT DETECTED
MDA: NOT DETECTED
MDEA: NOT DETECTED
MDMA URINE: NOT DETECTED
MEPERIDINE: NOT DETECTED
METHADONE: NOT DETECTED
METHAMPHETAMINE: NOT DETECTED
METHYLPHENIDATE: NOT DETECTED
MIDAZOLAM: NOT DETECTED
MORPHINE: NOT DETECTED
NALOXONE: NOT DETECTED
NORBUPRENORPHINE, FREE: NOT DETECTED
NORDIAZEPAM: NOT DETECTED
NORFENTANYL: NOT DETECTED
NORHYDROCODONE, URINE: NOT DETECTED
NOROXYCODONE: NOT DETECTED
NOROXYMORPHONE, URINE: NOT DETECTED
OXAZEPAM: NOT DETECTED
OXYCODONE: NOT DETECTED
OXYMORPHONE: NOT DETECTED
PAIN MANAGEMENT DRUG PANEL: NORMAL
PAIN MANAGEMENT DRUG PANEL: NORMAL
PCP: NOT DETECTED
PHENTERMINE: NOT DETECTED
PREGABALIN: NOT DETECTED
TAPENTADOL, URINE: NOT DETECTED
TAPENTADOL-O-SULFATE, URINE: NOT DETECTED
TEMAZEPAM: NOT DETECTED
TRAMADOL: NOT DETECTED
ZOLPIDEM: NOT DETECTED

## 2021-12-06 ENCOUNTER — PATIENT MESSAGE (OUTPATIENT)
Dept: PRIMARY CARE CLINIC | Age: 31
End: 2021-12-06

## 2021-12-06 DIAGNOSIS — F33.0 MAJOR DEPRESSIVE DISORDER, RECURRENT EPISODE, MILD (HCC): ICD-10-CM

## 2021-12-06 DIAGNOSIS — F90.0 ATTENTION DEFICIT HYPERACTIVITY DISORDER (ADHD), PREDOMINANTLY INATTENTIVE TYPE: ICD-10-CM

## 2021-12-06 NOTE — TELEPHONE ENCOUNTER
From: Michael Vincent  To: Dr. Shad Lyons  Sent: 12/6/2021 1:57 PM EST  Subject: 12/1 urinalysis mistake     Im sorry, my vyvanse and adderall didnt show up in my urine screen because I was at my moms the night before thanksgiving when I got a call that I had been exposed to covid at work so I had to quarantine for a week there (its an hour away) and I didnt have my meds with me as I was planning on coming back home that same night. When I did the urine screen I didnt consider that they wouldnt be in my system anymore, I took my vyvanse the morning of but it was less than 20 mins before I took the test. Im at work right now and wont be able to get back to the lab before they close in a couple hours and Im flying out of town tonight to a wedding Im in this weekend. I can take another lab test on Monday when I get back but I only have 3 pills left so it likely wouldnt register then either. Again, I apologize, I honestly didnt know that it leaves your system that quickly.

## 2021-12-08 RX ORDER — DEXTROAMPHETAMINE SACCHARATE, AMPHETAMINE ASPARTATE, DEXTROAMPHETAMINE SULFATE AND AMPHETAMINE SULFATE 7.5; 7.5; 7.5; 7.5 MG/1; MG/1; MG/1; MG/1
30 TABLET ORAL
Qty: 30 TABLET | Refills: 0 | Status: SHIPPED | OUTPATIENT
Start: 2021-12-08 | End: 2022-01-14 | Stop reason: SDUPTHER

## 2022-01-14 DIAGNOSIS — F90.0 ATTENTION DEFICIT HYPERACTIVITY DISORDER (ADHD), PREDOMINANTLY INATTENTIVE TYPE: ICD-10-CM

## 2022-01-14 DIAGNOSIS — F33.0 MAJOR DEPRESSIVE DISORDER, RECURRENT EPISODE, MILD (HCC): ICD-10-CM

## 2022-01-14 RX ORDER — DEXTROAMPHETAMINE SACCHARATE, AMPHETAMINE ASPARTATE, DEXTROAMPHETAMINE SULFATE AND AMPHETAMINE SULFATE 7.5; 7.5; 7.5; 7.5 MG/1; MG/1; MG/1; MG/1
30 TABLET ORAL
Qty: 30 TABLET | Refills: 0 | Status: SHIPPED | OUTPATIENT
Start: 2022-01-14 | End: 2022-02-11 | Stop reason: SDUPTHER

## 2022-01-19 ENCOUNTER — TELEPHONE (OUTPATIENT)
Dept: PRIMARY CARE CLINIC | Age: 32
End: 2022-01-19

## 2022-01-19 DIAGNOSIS — J45.41 MODERATE PERSISTENT ASTHMA WITH EXACERBATION: ICD-10-CM

## 2022-01-19 RX ORDER — PREDNISONE 20 MG/1
TABLET ORAL
Qty: 18 TABLET | Refills: 1 | Status: SHIPPED | OUTPATIENT
Start: 2022-01-19 | End: 2022-01-19 | Stop reason: SDUPTHER

## 2022-01-19 RX ORDER — PREDNISONE 20 MG/1
TABLET ORAL
Qty: 15 TABLET | Refills: 0 | Status: SHIPPED | OUTPATIENT
Start: 2022-01-19 | End: 2022-03-16

## 2022-01-19 NOTE — TELEPHONE ENCOUNTER
Script was a prednisone taper ; resent but was meant to say take different does on different days so script should have been correct. .. Sent new script if that helps with new instructions. ..    1. Moderate persistent asthma with exacerbation  - predniSONE (DELTASONE) 20 MG tablet; Take 2 tabs PO daily for 3 days, then 1.5 tabs PO daily  x 3 days, then 1 tab PO daily x 3 days, then 0.5 tab PO daily for 3 days  Dispense: 15 tablet;  Refill: 0

## 2022-01-19 NOTE — TELEPHONE ENCOUNTER
predniSONE (DELTASONE) 20 MG tablet    This has multiple sets of directions and the pharmacy needs clarification. TAKE 2 TABLETS BY MOUTH DAILY X3 DAYS. THEN TAKE 2 TABLETS DAILY X3 DAYS. THEN TAKE 1 TABLET DAILY X2 DAYS.  THEN TAKE 1/2 TABLET DAILY X2 DAYS

## 2022-02-11 DIAGNOSIS — F33.0 MAJOR DEPRESSIVE DISORDER, RECURRENT EPISODE, MILD (HCC): ICD-10-CM

## 2022-02-11 DIAGNOSIS — F90.0 ATTENTION DEFICIT HYPERACTIVITY DISORDER (ADHD), PREDOMINANTLY INATTENTIVE TYPE: ICD-10-CM

## 2022-02-11 RX ORDER — DEXTROAMPHETAMINE SACCHARATE, AMPHETAMINE ASPARTATE, DEXTROAMPHETAMINE SULFATE AND AMPHETAMINE SULFATE 7.5; 7.5; 7.5; 7.5 MG/1; MG/1; MG/1; MG/1
30 TABLET ORAL
Qty: 30 TABLET | Refills: 0 | Status: SHIPPED | OUTPATIENT
Start: 2022-02-11 | End: 2022-03-11 | Stop reason: SDUPTHER

## 2022-02-11 NOTE — TELEPHONE ENCOUNTER
Filled; CDA and UDS utd and appropriate  PDMP Monitoring:    Last PDMP Zulma Fatima as Reviewed [facility]):  Review User Review Instant Review Result   Cindy Liang. 2/11/2022 10:01 AM Reviewed PDMP [1]     [unfilled]  Urine Drug Screenings (1 yr)     Drug Panel-PM-HI Res-UR Interp-A  Collected: 12/1/2021 12:22 PM (Final result)   Narrative: Referred out by:  [facility]  [address], [place] [address]   Phone [phone]     Complete Results          Drug Panel-PM-HI Res-UR Interp-A  Collected: 8/5/2021  1:48 PM (Final result)   Narrative: Referred out by:  [address]   Phone [phone]     Complete Results          Drug Panel-PM-HI Res-UR Interp-A  Collected: 3/11/2020  2:28 PM (Final result)   Narrative: Referred out by:  [facility]  [address] [place] [address]   Phone [phone]     Complete Results              Medication Contract and Consent for Opioid Use Documents Filed     Patient Documents     Type of Document Status Date Received Received By Description    Medication Contract [Status Missing]  RINKU STANTON Controlled Substance Med. Agreement 4-1-14    Medication Contract Received 3/11/2020  2:46 PM MADELINE MCKEON Medication Contract              1. Attention deficit hyperactivity disorder (ADHD), predominantly inattentive type  2. Major depressive disorder, recurrent episode, mild (HCC)  - lisdexamfetamine (VYVANSE) 70 MG capsule; Take 1 capsule by mouth every morning for 30 days. Dispense: 30 capsule; Refill: 0  - amphetamine-dextroamphetamine (ADDERALL, 30MG,) 30 MG tablet; Take 1 tablet by mouth Daily with lunch for 30 days. as directed. Dispense: 30 tablet; Refill: 0      Ozzy Briones DO  2/11/2022

## 2022-03-11 DIAGNOSIS — F90.0 ATTENTION DEFICIT HYPERACTIVITY DISORDER (ADHD), PREDOMINANTLY INATTENTIVE TYPE: ICD-10-CM

## 2022-03-11 DIAGNOSIS — F33.0 MAJOR DEPRESSIVE DISORDER, RECURRENT EPISODE, MILD (HCC): ICD-10-CM

## 2022-03-11 RX ORDER — DEXTROAMPHETAMINE SACCHARATE, AMPHETAMINE ASPARTATE, DEXTROAMPHETAMINE SULFATE AND AMPHETAMINE SULFATE 7.5; 7.5; 7.5; 7.5 MG/1; MG/1; MG/1; MG/1
30 TABLET ORAL
Qty: 30 TABLET | Refills: 0 | Status: SHIPPED | OUTPATIENT
Start: 2022-03-11 | End: 2022-04-07 | Stop reason: SDUPTHER

## 2022-03-16 DIAGNOSIS — J45.41 MODERATE PERSISTENT ASTHMA WITH EXACERBATION: ICD-10-CM

## 2022-03-16 DIAGNOSIS — J45.30 MILD PERSISTENT REACTIVE AIRWAY DISEASE WITHOUT COMPLICATION: ICD-10-CM

## 2022-03-16 DIAGNOSIS — J30.1 ALLERGIC RHINITIS DUE TO POLLEN, UNSPECIFIED SEASONALITY: ICD-10-CM

## 2022-03-16 RX ORDER — ALBUTEROL SULFATE 90 UG/1
2 AEROSOL, METERED RESPIRATORY (INHALATION) EVERY 6 HOURS PRN
Qty: 6.7 G | Refills: 2 | Status: SHIPPED | OUTPATIENT
Start: 2022-03-16 | End: 2022-08-15 | Stop reason: CLARIF

## 2022-03-16 RX ORDER — IPRATROPIUM BROMIDE AND ALBUTEROL SULFATE 2.5; .5 MG/3ML; MG/3ML
SOLUTION RESPIRATORY (INHALATION)
Qty: 90 ML | Refills: 1 | Status: SHIPPED | OUTPATIENT
Start: 2022-03-16 | End: 2022-07-05 | Stop reason: SDUPTHER

## 2022-03-16 RX ORDER — PREDNISONE 20 MG/1
TABLET ORAL
Qty: 15 TABLET | Refills: 0 | Status: SHIPPED | OUTPATIENT
Start: 2022-03-16 | End: 2022-07-15 | Stop reason: ALTCHOICE

## 2022-03-16 RX ORDER — SALMETEROL XINAFOATE 50 MCG
BLISTER, WITH INHALATION DEVICE INHALATION
Qty: 60 EACH | Refills: 5 | Status: SHIPPED | OUTPATIENT
Start: 2022-03-16

## 2022-03-16 RX ORDER — ALBUTEROL SULFATE 90 UG/1
2 AEROSOL, METERED RESPIRATORY (INHALATION) EVERY 6 HOURS PRN
Qty: 6.7 G | Refills: 2 | Status: CANCELLED | OUTPATIENT
Start: 2022-03-16

## 2022-03-16 RX ORDER — BUDESONIDE 90 UG/1
AEROSOL, POWDER RESPIRATORY (INHALATION)
Qty: 1 EACH | Refills: 3 | Status: SHIPPED | OUTPATIENT
Start: 2022-03-16

## 2022-04-04 RX ORDER — OMEPRAZOLE 40 MG/1
40 CAPSULE, DELAYED RELEASE ORAL DAILY
Qty: 90 CAPSULE | Refills: 3 | Status: SHIPPED | OUTPATIENT
Start: 2022-04-04

## 2022-04-07 DIAGNOSIS — F33.0 MAJOR DEPRESSIVE DISORDER, RECURRENT EPISODE, MILD (HCC): ICD-10-CM

## 2022-04-07 DIAGNOSIS — F90.0 ATTENTION DEFICIT HYPERACTIVITY DISORDER (ADHD), PREDOMINANTLY INATTENTIVE TYPE: Primary | ICD-10-CM

## 2022-04-07 RX ORDER — DEXTROAMPHETAMINE SACCHARATE, AMPHETAMINE ASPARTATE, DEXTROAMPHETAMINE SULFATE AND AMPHETAMINE SULFATE 7.5; 7.5; 7.5; 7.5 MG/1; MG/1; MG/1; MG/1
30 TABLET ORAL
Qty: 30 TABLET | Refills: 0 | Status: SHIPPED | OUTPATIENT
Start: 2022-04-07 | End: 2022-05-05 | Stop reason: SDUPTHER

## 2022-04-07 NOTE — TELEPHONE ENCOUNTER
PDMP Monitoring:    Last PDMP [place] as Reviewed [facility]):  Review User Review Instant Review Result   Natty Nation. 4/7/2022  4:50 PM Reviewed PDMP [1]     [unfilled]  Urine Drug Screenings (1 yr)     Drug Panel-PM-HI Res-UR Interp-A  Collected: 12/1/2021 12:22 PM (Final result)   Narrative: Referred out by:  [facility]  [address] [facility] [address]   Phone [phone]     Complete Results          Drug Panel-PM-HI Res-UR Interp-A  Collected: 8/5/2021  1:48 PM (Final result)   Narrative: Referred out by:  [address]   Phone [phone]     Complete Results          Drug Panel-PM-HI Res-UR Interp-A  Collected: 3/11/2020  2:28 PM (Final result)   Narrative: Referred out by:  [facility]  [address] [facility] [place] [address]   Phone [phone]     Complete Results              Medication Contract and Consent for Opioid Use Documents Filed     Patient Documents     Type of Document Status Date Received Received By Description    Medication Contract [Status Missing]  RINKU STANTON Controlled Substance Med. Agreement 4-1-14    Medication Contract Received 3/11/2020  2:46 PM MADELINE MCKEON Medication Contract              1. Attention deficit hyperactivity disorder (ADHD), predominantly inattentive type  2. Major depressive disorder, recurrent episode, mild (HCC)  - lisdexamfetamine (VYVANSE) 70 MG capsule; Take 1 capsule by mouth every morning for 30 days. Dispense: 30 capsule; Refill: 0  - amphetamine-dextroamphetamine (ADDERALL, 30MG,) 30 MG tablet; Take 1 tablet by mouth Daily with lunch for 30 days. as directed. Dispense: 30 tablet; Refill: 0      Ozzy Thomas DO  4/7/2022

## 2022-04-08 DIAGNOSIS — F90.0 ATTENTION DEFICIT HYPERACTIVITY DISORDER (ADHD), PREDOMINANTLY INATTENTIVE TYPE: ICD-10-CM

## 2022-04-08 DIAGNOSIS — F33.0 MAJOR DEPRESSIVE DISORDER, RECURRENT EPISODE, MILD (HCC): ICD-10-CM

## 2022-04-08 NOTE — TELEPHONE ENCOUNTER
Script sent to [facility]  PDMP Monitoring:    Last PDMP Rissa Huitron as Reviewed [facility]):  Review User Review Instant Review Result   Ines Aden. 4/7/2022  4:50 PM Reviewed PDMP [1]     [unfilled]  Urine Drug Screenings (1 yr)     Drug Panel-PM-HI Res-UR Interp-A  Collected: 12/1/2021 12:22 PM (Final result)   Narrative: Referred out by:  [facility]  [address], [address] [employer] [address]   Phone [phone]     Complete Results          Drug Panel-PM-HI Res-UR Interp-A  Collected: 8/5/2021  1:48 PM (Final result)   Narrative: Referred out by:  [facility] [address], [address] [place], [place] [address]   Phone [phone]     Complete Results          Drug Panel-PM-HI Res-UR Interp-A  Collected: 3/11/2020  2:28 PM (Final result)   Narrative: Referred out by:  [facility]  [address], [address] [employer] [address]   Phone [phone]     Complete Results              Medication Contract and Consent for Opioid Use Documents Filed     Patient Documents     Type of Document Status Date Received Received By Description    Medication Contract [Status Missing]  RINKU STANTON Controlled Substance Med. Agreement 4-1-14    Medication Contract Received 3/11/2020  2:46 PM MADELINE MCKEON Medication Contract              1. Attention deficit hyperactivity disorder (ADHD), predominantly inattentive type  2. Major depressive disorder, recurrent episode, mild (HCC)  - lisdexamfetamine (VYVANSE) 70 MG capsule; Take 1 capsule by mouth every morning for 30 days. Dispense: 30 capsule; Refill: 0      Ozzy Flannery DO  4/8/2022

## 2022-04-15 ENCOUNTER — HOSPITAL ENCOUNTER (EMERGENCY)
Age: 32
Discharge: HOME | End: 2022-04-15
Attending: EMERGENCY MEDICINE
Payer: MEDICAID

## 2022-04-15 VITALS
OXYGEN SATURATION: 98 % | BODY MASS INDEX: 27.2 KG/M2 | HEART RATE: 102 BPM | SYSTOLIC BLOOD PRESSURE: 128 MMHG | DIASTOLIC BLOOD PRESSURE: 74 MMHG | WEIGHT: 194.3 LBS | RESPIRATION RATE: 16 BRPM | HEIGHT: 71 IN | TEMPERATURE: 98.1 F

## 2022-04-15 DIAGNOSIS — H60.501 ACUTE OTITIS EXTERNA OF RIGHT EAR, UNSPECIFIED TYPE: ICD-10-CM

## 2022-04-15 DIAGNOSIS — H72.91 PERFORATION OF RIGHT TYMPANIC MEMBRANE: Primary | ICD-10-CM

## 2022-04-15 PROCEDURE — 99283 EMERGENCY DEPT VISIT LOW MDM: CPT

## 2022-04-15 PROCEDURE — 6370000000 HC RX 637 (ALT 250 FOR IP): Performed by: EMERGENCY MEDICINE

## 2022-04-15 RX ORDER — CIPROFLOXACIN AND DEXAMETHASONE 3; 1 MG/ML; MG/ML
4 SUSPENSION/ DROPS AURICULAR (OTIC) 2 TIMES DAILY
Status: DISCONTINUED | OUTPATIENT
Start: 2022-04-15 | End: 2022-04-15 | Stop reason: HOSPADM

## 2022-04-15 RX ORDER — CIPROFLOXACIN AND DEXAMETHASONE 3; 1 MG/ML; MG/ML
4 SUSPENSION/ DROPS AURICULAR (OTIC) 2 TIMES DAILY
Qty: 7.5 ML | Refills: 0 | Status: SHIPPED | OUTPATIENT
Start: 2022-04-15 | End: 2022-04-22

## 2022-04-15 RX ADMIN — CIPROFLOXACIN AND DEXAMETHASONE 4 DROP: 3; 1 SUSPENSION/ DROPS AURICULAR (OTIC) at 01:13

## 2022-04-15 ASSESSMENT — PAIN DESCRIPTION - ORIENTATION: ORIENTATION: RIGHT

## 2022-04-15 ASSESSMENT — PAIN DESCRIPTION - FREQUENCY: FREQUENCY: INTERMITTENT

## 2022-04-15 ASSESSMENT — PAIN SCALES - GENERAL: PAINLEVEL_OUTOF10: 2

## 2022-04-15 ASSESSMENT — PAIN DESCRIPTION - PAIN TYPE: TYPE: ACUTE PAIN

## 2022-04-15 ASSESSMENT — PAIN DESCRIPTION - LOCATION: LOCATION: EAR

## 2022-04-15 ASSESSMENT — PAIN DESCRIPTION - DESCRIPTORS: DESCRIPTORS: ACHING

## 2022-04-15 NOTE — ED PROVIDER NOTES
[facility] PROVIDER NOTE    Patient Identification  Pt Name: Yolanda Cueva  MRN: [id number]  [place] 1990  Date of evaluation: 4/15/2022  Provider: Masoud Shell MD  PCP: Natan Lopez. Felix Siu.,     Chief Complaint  Otalgia (rt poss 5-6 daysswam 2 days ago ( water in ear))      HPI  History provided by patient   This is a 32 y.o. male who presents to the ED for right ear discomfort. Ongoing for 5 to 6 days. 2 days ago, he did go swimming. He has history of tympanic membrane rupture and surgery years ago on the right side. No headache. No throat pain. No nausea or vomiting. No fevers or chills. Tolerating oral intake without issue. Hearing is mildly decreased in the right ear but still present. ROS  12 systems reviewed, pertinent positives/negatives per HPI otherwise noted to be negative. I have reviewed the following nursing documentation:  Allergies: Advair [fluticasone-salmeterol] and Erythromycin    Past medical history:   Past Medical History:   Diagnosis Date    ADHD (attention deficit hyperactivity disorder)     Allergic rhinitis     Asthma     Depression     RAD (reactive airway disease)      Past surgical history:   Past Surgical History:   Procedure Laterality Date    TYMPANOPLASTY Right 2019    had this past Summer    TYMPANOSTOMY TUBE PLACEMENT      x5 - also eardrum repaired (?), and had Ti's angina and separatefacial infection       Home medications:   Previous Medications    ALBUTEROL (PROVENTIL) (2.5 MG/3ML) 0.083% NEBULIZER SOLUTION    Take 3 mLs by nebulization every 6 hours as needed for Wheezing    ALBUTEROL SULFATE  (90 BASE) MCG/ACT INHALER    INHALE 2 PUFFS INTO THE LUNGS EVERY 6 HOURS AS NEEDED FOR WHEEZING    AMPHETAMINE-DEXTROAMPHETAMINE (ADDERALL, 30MG,) 30 MG TABLET    Take 1 tablet by mouth Daily with lunch for 30 days. as directed.     BUPROPION (WELLBUTRIN XL) 300 MG EXTENDED RELEASE TABLET    TAKE 1 TABLET BY MOUTH EVERY MORNING    CALCIUM CARBONATE (TUMS) 500 MG CHEWABLE TABLET    Take 1 tablet by mouth daily. CETIRIZINE (ZYRTEC) 10 MG TABLET    Take 1 tablet by mouth daily As needed for allergies. FLUTICASONE (FLONASE) 50 MCG/ACT NASAL SPRAY    SHAKE LIQUID AND USE 1 TO 2 SPRAYS IN EACH NOSTRIL DAILY AS NEEDED FOR CONGESTION OR ALLERGIES    IPRATROPIUM-ALBUTEROL (DUONEB) 0.5-2.5 (3) MG/3ML SOLN NEBULIZER SOLUTION    USE 1 VIAL VIA NEBULIZER BY MOUTH EVERY 6 HOURS AS NEEDED FOR SHORTNESS OF BREATH    LISDEXAMFETAMINE (VYVANSE) 70 MG CAPSULE    Take 1 capsule by mouth every morning for 30 days. MONTELUKAST (SINGULAIR) 10 MG TABLET    Take 1 tablet by mouth nightly    NAPROXEN SODIUM (ALEVE) 220 MG CAPS    Take by mouth    OMEPRAZOLE (PRILOSEC) 40 MG DELAYED RELEASE CAPSULE    Take 1 capsule by mouth daily    PREDNISONE (DELTASONE) 10 MG TABLET    Take 4 tabs daily for 2 days,   Then 2 tabs daily for 2 days,   Then 1 tab daily for 2 days. PREDNISONE (DELTASONE) 20 MG TABLET    TAKE 2 TABLETS DAILY X 3 DAYS. 1 AND ONE-HALF TABLET DAILY X 3 DAYS. 1 TABLET DAILY X 3 DAYS. ONE-HALF TABLET DAILY X 3 DAYS    PULMICORT FLEXHALER 90 MCG/ACT AEPB INHALER    INHALE 2 PUFFS INTO THE LUNGS TWICE DAILY    QVAR REDIHALER 40 MCG/ACT AERB INHALER    INHALE 1 PUFF PO BID    SEREVENT DISKUS 50 MCG/DOSE DISKUS INHALER    INHALE 1 PUFF INTO THE LUNGS TWICE DAILY       Social history:  reports that he quit smoking about 8 years ago. He has a 2.25 pack-year smoking history. He has quit using smokeless tobacco. He reports current alcohol use of about 5.0 - 6.0 standard drinks of alcohol per week. He reports that he does not use drugs.     Family history:    Family History   Problem Relation Age of Onset    Depression Mother     Hearing Loss Father     Heart Disease Father 61        MI - smoker         Exam  ED Triage Vitals [04/15/22 0051]   BP Temp Temp Source Pulse Resp SpO2 Height Weight   128/74 98.1 °F (36.7 °C) Oral 102 16 98 % 5' 11\" (1.803 m) 194 lb 4.8 oz (88.1 kg)     Nursing note and vitals reviewed. Constitutional: In no acute distress  HENT:      Head: Normocephalic      Ears: External ears normal.  Left ear normal tympanic membrane and canal.  Right ear ruptured tympanic membrane, with evidence of erythema in the external canal with no swelling     Nose: Nose normal.     Mouth: Membrane mucosa moist   Eyes: No discharge. Neck: Supple. Trachea midline. Cardiovascular: Regular rate. Warm extremities  Pulmonary/Chest: Effort normal. No respiratory distress. Abdominal: Soft. No distension. Nontender  : Deferred  Rectal: Deferred   Musculoskeletal: Moves all extremities. No gross deformity. Neurological: Alert and oriented. Face symmetric. Speech is clear. Skin: Warm and dry. Psychiatric: Normal mood and affect. Behavior is normal.    Procedures        Radiology  No orders to display       Labs  No results found for this visit on 04/15/22. Screenings           MDM and ED Course  This is a 32 y.o. male who presents to the ED for right ear discomfort after swimming. Has evidence of perforated tympanic membrane and otitis externa. Does not appear to be malignant otitis externa. He is well-appearing without signs of systemic infection. He does follow-up with an ear nose and throat doctor. Discharged. Patient agrees to call his doctor tomorrow. [unfilled]    Final Impression  1. Perforation of right tympanic membrane    2. Acute otitis externa of right ear, unspecified type        Blood pressure 128/74, pulse 102, temperature 98.1 °F (36.7 °C), temperature source Oral, resp. rate 16, height 5' 11\" (1.803 m), weight 194 lb 4.8 oz (88.1 kg), SpO2 98 %.      Disposition:  DISPOSITION Decision To Discharge 04/15/2022 01:04:46 AM      Patient Referrals:  Milagros Weaver, [address] [place] [address]/ Kay [phone]    Call in 1 day        Discharge Medications:  New Prescriptions CIPROFLOXACIN-DEXAMETHASONE (CIPRODEX) 0.3-0.1 % OTIC SUSPENSION    Place 4 drops into the right ear 2 times daily for 7 days       Discontinued Medications:  Discontinued Medications    No medications on file       This chart was generated using the [address] [place] [address] [address] dictation system. I created this record but it may contain dictation errors given the limitations of this technology.         Lisa Serna MD  04/15/22 0111

## 2022-04-15 NOTE — ED NOTES
Pt to ed with c/o rt ear pain x 2 days after doing laps.  Exam per MD.      Mary Nieves, RN  04/15/22 1749

## 2022-05-05 DIAGNOSIS — F90.0 ATTENTION DEFICIT HYPERACTIVITY DISORDER (ADHD), PREDOMINANTLY INATTENTIVE TYPE: ICD-10-CM

## 2022-05-05 DIAGNOSIS — F33.0 MAJOR DEPRESSIVE DISORDER, RECURRENT EPISODE, MILD (HCC): ICD-10-CM

## 2022-05-05 RX ORDER — DEXTROAMPHETAMINE SACCHARATE, AMPHETAMINE ASPARTATE, DEXTROAMPHETAMINE SULFATE AND AMPHETAMINE SULFATE 7.5; 7.5; 7.5; 7.5 MG/1; MG/1; MG/1; MG/1
30 TABLET ORAL
Qty: 30 TABLET | Refills: 0 | Status: SHIPPED | OUTPATIENT
Start: 2022-05-05 | End: 2022-06-02 | Stop reason: SDUPTHER

## 2022-05-09 DIAGNOSIS — F33.0 MAJOR DEPRESSIVE DISORDER, RECURRENT EPISODE, MILD (HCC): ICD-10-CM

## 2022-05-09 RX ORDER — BUPROPION HYDROCHLORIDE 300 MG/1
TABLET ORAL
Qty: 30 TABLET | Refills: 5 | Status: SHIPPED | OUTPATIENT
Start: 2022-05-09 | End: 2022-10-03 | Stop reason: SDUPTHER

## 2022-06-02 DIAGNOSIS — F33.0 MAJOR DEPRESSIVE DISORDER, RECURRENT EPISODE, MILD (HCC): ICD-10-CM

## 2022-06-02 DIAGNOSIS — F90.0 ATTENTION DEFICIT HYPERACTIVITY DISORDER (ADHD), PREDOMINANTLY INATTENTIVE TYPE: ICD-10-CM

## 2022-06-02 RX ORDER — DEXTROAMPHETAMINE SACCHARATE, AMPHETAMINE ASPARTATE, DEXTROAMPHETAMINE SULFATE AND AMPHETAMINE SULFATE 7.5; 7.5; 7.5; 7.5 MG/1; MG/1; MG/1; MG/1
30 TABLET ORAL
Qty: 30 TABLET | Refills: 0 | Status: SHIPPED | OUTPATIENT
Start: 2022-06-02 | End: 2022-07-05 | Stop reason: SDUPTHER

## 2022-06-02 NOTE — TELEPHONE ENCOUNTER
Refill Request - Controlled Substance         Last Seen Department: 10/5/2021  Last Seen by PCP: 10/5/2021    Last Written: adderall 5/5, 0 refills  vyvanse 5/5, 0 refills    Last UDS: 12/8/21    Med Agreement Signed On: 10/6/21    Next Appointment: 6/7/2022         Requested Prescriptions     Pending Prescriptions Disp Refills    amphetamine-dextroamphetamine (ADDERALL, 30MG,) 30 MG tablet 30 tablet 0     Sig: Take 1 tablet by mouth Daily with lunch for 30 days. as directed.  lisdexamfetamine (VYVANSE) 70 MG capsule 30 capsule 0     Sig: Take 1 capsule by mouth every morning for 30 days.

## 2022-06-03 DIAGNOSIS — F33.0 MAJOR DEPRESSIVE DISORDER, RECURRENT EPISODE, MILD (HCC): ICD-10-CM

## 2022-06-03 DIAGNOSIS — F90.0 ATTENTION DEFICIT HYPERACTIVITY DISORDER (ADHD), PREDOMINANTLY INATTENTIVE TYPE: ICD-10-CM

## 2022-06-03 NOTE — TELEPHONE ENCOUNTER
Refill Request - Controlled Substance         Last Seen Department: 10/5/2021  Last Seen by PCP: 10/5/2021    Last Written: 5/5, 0 refills, was sent yesterday to [facility], they are out of stock, please send to the new pharmacy. [facility]    Last UDS: 12/8/21    Med Agreement Signed On: 10/6/21    Next Appointment: 6/7/2022         Requested Prescriptions     Pending Prescriptions Disp Refills    lisdexamfetamine (VYVANSE) 70 MG capsule 30 capsule 0     Sig: Take 1 capsule by mouth every morning for 30 days.

## 2022-06-07 ENCOUNTER — HOSPITAL ENCOUNTER (EMERGENCY)
Age: 32
Discharge: HOME | End: 2022-06-07
Attending: EMERGENCY MEDICINE
Payer: MEDICAID

## 2022-06-07 VITALS
BODY MASS INDEX: 26.99 KG/M2 | SYSTOLIC BLOOD PRESSURE: 153 MMHG | HEIGHT: 71 IN | DIASTOLIC BLOOD PRESSURE: 104 MMHG | RESPIRATION RATE: 18 BRPM | OXYGEN SATURATION: 98 % | HEART RATE: 114 BPM | WEIGHT: 192.8 LBS | TEMPERATURE: 98.6 F

## 2022-06-07 DIAGNOSIS — S61.411A LACERATION OF RIGHT HAND WITHOUT FOREIGN BODY, INITIAL ENCOUNTER: Primary | ICD-10-CM

## 2022-06-07 PROCEDURE — 99283 EMERGENCY DEPT VISIT LOW MDM: CPT

## 2022-06-07 PROCEDURE — 12002 RPR S/N/AX/GEN/TRNK2.6-7.5CM: CPT

## 2022-06-07 ASSESSMENT — PAIN DESCRIPTION - PAIN TYPE: TYPE: ACUTE PAIN

## 2022-06-07 ASSESSMENT — PAIN DESCRIPTION - LOCATION: LOCATION: HAND

## 2022-06-07 ASSESSMENT — PAIN DESCRIPTION - DESCRIPTORS: DESCRIPTORS: ACHING

## 2022-06-07 ASSESSMENT — PAIN SCALES - GENERAL: PAINLEVEL_OUTOF10: 4

## 2022-06-07 ASSESSMENT — PAIN DESCRIPTION - ORIENTATION: ORIENTATION: RIGHT

## 2022-06-07 ASSESSMENT — PAIN - FUNCTIONAL ASSESSMENT: PAIN_FUNCTIONAL_ASSESSMENT: 0-10

## 2022-06-07 NOTE — ED NOTES
Placed non adherent gauze and wrapped right palm laceration with ace wrap. Then placed a Velcro wrist splint. Pt. Tolerated well.      Troy Carrillo RCP  06/07/22 5913

## 2022-06-07 NOTE — ED NOTES
Soaking right palm laceration in hibiclens and saline. Pt tolerating well.       Adiel Breaux, BRANDEE  06/07/22 7592

## 2022-06-07 NOTE — ED NOTES
Patient prepared for and ready to be discharged. Patient discharged at this time in no acute distress after verbalizing understanding of discharge instructions. Patient left after receiving After Visit Summary instructions.         Janan Ganser, RN  06/07/22 6766

## 2022-06-14 NOTE — ED PROVIDER NOTES
[address]  [facility]      Pt Name: Lucy Pierre  MRN: [id number]  [place] 1990  Date ofevaluation: 6/7/2022  Provider: Caitie Pena MD    CHIEF COMPLAINT       Chief Complaint   Patient presents with    Laceration     rt hand         HISTORY OF PRESENT ILLNESS   (Location/Symptom, Timing/Onset,Context/Setting, Quality, Duration, Modifying Factors, Severity)  Note limiting factors. Lucy Pierre is a 32 y.o. male  who  has a past medical history of ADHD (attention deficit hyperactivity disorder), Allergic rhinitis, Asthma, Depression, and RAD (reactive airway disease). who presents to the emergency department for evaluation of a laceration to the palm of his right hand. Patient reports that he was trying to open his fridge and there was a sharp edge which lacerated his hand. States occurred shortly prior to arrival.  Denies loss of sensation or motor function. States that he thinks his tetanus is up-to-date. Denies additional injury. HPI    NursingNotes were reviewed. REVIEW OF SYSTEMS    (2-9 systems for level 4, 10 or more for level 5)     Review of Systems   Skin: Positive for wound. Neurological: Negative for weakness and numbness. Except as noted above the remainder of the review of systems was reviewed and negative.        PAST MEDICAL HISTORY     Past Medical History:   Diagnosis Date    ADHD (attention deficit hyperactivity disorder)     Allergic rhinitis     Asthma     Depression     RAD (reactive airway disease)          SURGICALHISTORY       Past Surgical History:   Procedure Laterality Date    TYMPANOPLASTY Right 2019    had this past Summer    TYMPANOSTOMY TUBE PLACEMENT      x5 - also eardrum repaired (?), and had Ti's angina and separatefacial infection         CURRENT MEDICATIONS       Discharge Medication List as of 6/7/2022  6:11 AM      CONTINUE these medications which have NOT CHANGED    Details   buPROPion (WELLBUTRIN XL) 300 MG extended release tablet Take 1 tablet by mouth every morning, Disp-30 tablet, R-5Normal      lisdexamfetamine (VYVANSE) 70 MG capsule Take 1 capsule by mouth every morning for 30 days. , Disp-30 capsule, R-0Normal      amphetamine-dextroamphetamine (ADDERALL, 30MG,) 30 MG tablet Take 1 tablet by mouth Daily with lunch for 30 days. as directed., Disp-30 tablet, R-0Normal      omeprazole (PRILOSEC) 40 MG delayed release capsule Take 1 capsule by mouth daily, Disp-90 capsule, R-3Normal      PULMICORT FLEXHALER 90 MCG/ACT AEPB inhaler INHALE 2 PUFFS INTO THE LUNGS TWICE DAILY, Disp-1 each, R-3Normal      SEREVENT DISKUS 50 MCG/DOSE diskus inhaler INHALE 1 PUFF INTO THE LUNGS TWICE DAILY, Disp-60 each, R-5Normal      albuterol sulfate  (90 Base) MCG/ACT inhaler INHALE 2 PUFFS INTO THE LUNGS EVERY 6 HOURS AS NEEDED FOR WHEEZING, Disp-6.7 g, R-2Normal      ipratropium-albuterol (DUONEB) 0.5-2.5 (3) MG/3ML SOLN nebulizer solution USE 1 VIAL VIA NEBULIZER BY MOUTH EVERY 6 HOURS AS NEEDED FOR SHORTNESS OF BREATH, Disp-90 mL, R-1Normal      !! predniSONE (DELTASONE) 20 MG tablet TAKE 2 TABLETS DAILY X 3 DAYS. 1 AND ONE-HALF TABLET DAILY X 3 DAYS. 1 TABLET DAILY X 3 DAYS. ONE-HALF TABLET DAILY X 3 DAYS, Disp-15 tablet, R-0Normal      !! predniSONE (DELTASONE) 10 MG tablet Take 4 tabs daily for 2 days,   Then 2 tabs daily for 2 days,   Then 1 tab daily for 2 days. , Disp-14 tablet, R-0Print      cetirizine (ZYRTEC) 10 MG tablet Take 1 tablet by mouth daily As needed for allergies. , Disp-90 tablet, R-3Print      fluticasone (FLONASE) 50 MCG/ACT nasal spray SHAKE LIQUID AND USE 1 TO 2 SPRAYS IN EACH NOSTRIL DAILY AS NEEDED FOR CONGESTION OR ALLERGIES, Disp-3 Bottle, R-3Print      montelukast (SINGULAIR) 10 MG tablet Take 1 tablet by mouth nightly, Disp-30 tablet, R-12Print      QVAR REDIHALER 40 MCG/ACT AERB inhaler INHALE 1 PUFF PO BID, R-3, DAWHistorical Med      Naproxen Sodium (ALEVE) 220 MG CAPS Take by mouth      albuterol (PROVENTIL) (2.5 MG/3ML) 0.083% nebulizer solution Take 3 mLs by nebulization every 6 hours as needed for Wheezing, Disp-25 each, R-2      calcium carbonate (TUMS) 500 MG chewable tablet Take 1 tablet by mouth daily. !! - Potential duplicate medications found. Please discuss with provider. 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    Smoking status: Former Smoker     Packs/day: 0.75     Years: 3.00     Pack years: 2.25     Quit date: 2014     Years since quittin.3    Smokeless tobacco: Former User   Vaping Use    Vaping Use: Never used   Substance and Sexual Activity    Alcohol use: Yes     Alcohol/week: 5.0 - 6.0 standard drinks     Types: 5 - 6 Cans of beer per week    Drug use: No    Sexual activity: Yes     Partners: Female     Birth control/protection: I.U.D., Condom   Other Topics Concern    None   Social History Narrative    None     Social Determinants of Health     Financial Resource Strain:     Difficulty of Paying Living Expenses: Not on file   Food Insecurity:     Worried About Running Out of Food in the Last Year: Not on file    Mila of Food in the Last Year: Not on file   Transportation Needs:     Lack of Transportation (Medical): Not on file    Lack of Transportation (Non-Medical):  Not on file   Physical Activity:     Days of Exercise per Week: Not on file    Minutes of Exercise per Session: Not on file   Stress:     Feeling of Stress : Not on file Social Connections:     Frequency of Communication with Friends and Family: Not on file    Frequency of Social Gatherings with Friends and Family: Not on file    Attends Caodaism Services: Not on file    Active Member of Clubs or Organizations: Not on file    Attends Club or Organization Meetings: Not on file    Marital Status: Not on file   Intimate Partner Violence:     Fear of Current or Ex-Partner: Not on file    Emotionally Abused: Not on file    Physically Abused: Not on file    Sexually Abused: Not on file   Housing Stability:     Unable to Pay for Housing in the Last Year: Not on file    Number of [place] in the Last Year: Not on file    Unstable Housing in the Last Year: Not on file       SCREENINGS    [place] Coma Scale  Eye Opening: Spontaneous  Best Verbal Response: Oriented  Best Motor Response: Obeys commands  [place] Coma Scale Score: 15        PHYSICAL EXAM    (up to 7 for level 4, 8 or more for level 5)     ED Triage Vitals [06/07/22 0516]   BP Temp Temp Source Heart Rate Resp SpO2 Height Weight   (!) 153/104 98.6 °F (37 °C) Oral (!) 114 18 98 % 5' 11\" (1.803 m) 192 lb 12.8 oz (87.5 kg)       Physical Exam  Vitals and nursing note reviewed. Constitutional:       General: He is not in acute distress. Appearance: He is well-developed. HENT:      Head: Normocephalic and atraumatic. Eyes:      Extraocular Movements: Extraocular movements intact. Conjunctiva/sclera: Conjunctivae normal.      Pupils: Pupils are equal, round, and reactive to light. Neck:      Trachea: No tracheal deviation. Cardiovascular:      Rate and Rhythm: Normal rate and regular rhythm. Pulmonary:      Effort: Pulmonary effort is normal.      Breath sounds: Normal breath sounds. No wheezing or rales. Abdominal:      General: There is no distension. Palpations: Abdomen is soft. Tenderness: There is no abdominal tenderness. Musculoskeletal:         General: No deformity.  Normal range of motion. Cervical back: Normal range of motion. Skin:     General: Skin is warm and dry. Capillary Refill: Capillary refill takes less than 2 seconds. Findings: Lesion present. No bruising. Neurological:      Mental Status: He is alert and oriented to person, place, and time. RESULTS     EKG: All EKG's are interpreted by the Emergency Department Physician who either signs or Co-signsthis chart in the absence of a cardiologist.        RADIOLOGY:   Radha Katharina such as CT, Ultrasound and MRI are read by the radiologist. Plain radiographic images are visualized and preliminarily interpreted by the emergency physician with the below findings:        Interpretation per the Radiologist below, if available at the time ofthis note:    No orders to display         ED BEDSIDE ULTRASOUND:   Performed by ED Physician - none    LABS:  Labs Reviewed - No data to display    All other labs were within normal range or not returned as of this dictation. EMERGENCY DEPARTMENT COURSE and DIFFERENTIAL DIAGNOSIS/MDM:   Vitals:    Vitals:    06/07/22 0516   BP: (!) 153/104   Pulse: (!) 114   Resp: 18   Temp: 98.6 °F (37 °C)   TempSrc: Oral   SpO2: 98%   Weight: 192 lb 12.8 oz (87.5 kg)   Height: 5' 11\" (1.803 m)       Patient was given thefollowing medications:  Medications - No data to display    ED COURSE & MEDICAL DECISION MAKING    Pertinent Labs & Imaging studies reviewed. (See chart for details)   -  Patient seen and evaluated in the emergency department. -  Triage and nursing notes reviewed and incorporated. -  Old chart records reviewed and incorporated.   -  Differential diagnosis includes: Differential diagnosis: Tendon laceration, neurologic injury, vascular injury, involvement of bone that could lead to osteomyelitis, retained foreign body, delayed bacterial skin infection, other    -  Work-up included:  See above  -  ED treatment included: See above  -  Results discussed with patient. Patient presents to the ED for evaluation of a hand laceration to left palm. On exam, bleeding is controlled. Motor function and sensation intact in the radial ulnar median nerves distally. .  Laceration appears to extend into the subcutaneous fat. No obvious signs of damage to the musculature or connective tissue. Local anesthesia applied and wound was repaired using 7 sutures . Patient was given an Ace wrap as well as brace for protection. patient feels improved on reevaluation. Symptomatic treatment with expectant management discussed with the patient and they and/or family members present are amenable to treatment plan and outpatient follow-up. Strict return precautions were discussed with the patient and those present. They demonstrated understanding of when to return to the emergency department for new or worsening symptoms. .  The patient is agreeable with plan of care and disposition. REASSESSMENT          CRITICAL CARE TIME   Total Critical Care time was 0 minutes, excluding separately reportable procedures. There was a high probability of clinically significant/life threatening deterioration in the patient's condition which required my urgent intervention. CONSULTS:  None    PROCEDURES:  Unless otherwise noted below, none     Lac Repair    Date/Time: 6/14/2022 3:39 AM  Performed by: Emanuel Dinh MD  Authorized by: Emanuel Dinh MD     Consent:     Consent obtained:  Verbal    Consent given by:  Patient    Risks discussed:  Infection, pain, poor cosmetic result and poor wound healing    Alternatives discussed:  No treatment and delayed treatment  Anesthesia (see MAR for exact dosages):      Anesthesia method:  Local infiltration    Local anesthetic:  Lidocaine 1% WITH epi  Laceration details:     Location:  Hand    Hand location:  R palm    Length (cm):  7    Depth (mm):  1  Repair type:     Repair type:  Simple  Pre-procedure details:     Preparation:  Patient was prepped and draped in usual sterile fashion  Exploration:     Hemostasis achieved with:  Epinephrine and direct pressure    Wound exploration: wound explored through full range of motion      Wound extent: no fascia violation noted, no foreign bodies/material noted, no muscle damage noted, no tendon damage noted, no underlying fracture noted and no vascular damage noted      Contaminated: no    Treatment:     Area cleansed with:  Saline    Amount of cleaning:  Standard    Irrigation solution:  Sterile saline    Irrigation volume:  400    Irrigation method:  Pressure wash  Skin repair:     Repair method:  Sutures    Suture size:  4-0    Suture material:  Prolene    Suture technique:  Simple interrupted    Number of sutures:  7  Approximation:     Approximation:  Close  Post-procedure details:     Dressing:  Splint for protection and bulky dressing    Patient tolerance of procedure: Tolerated well, no immediate complications        FINAL IMPRESSION      1. Laceration of right hand without foreign body, initial encounter          DISPOSITION/PLAN   DISPOSITION Decision To Discharge 06/07/2022 06:02:48 AM      PATIENT REFERREDTO:  Suresh Waterman.  Mary Anne Sesay, DO  [address] [place] [address]  [phone]    Schedule an appointment as soon as possible for a visit   As needed      DISCHARGEMEDICATIONS:  Discharge Medication List as of 6/7/2022  6:11 AM             (Please note that portions of this note were completed with a voice recognition program.  Efforts were made to edit the dictations but occasionally words are mis-transcribed.)    Laith Peraza MD (electronically signed)  Attending Emergency Physician          Laith Peraza MD  06/14/22 8650

## 2022-06-24 RX ORDER — MONTELUKAST SODIUM 10 MG/1
10 TABLET ORAL NIGHTLY
Qty: 30 TABLET | Refills: 5 | Status: SHIPPED | OUTPATIENT
Start: 2022-06-24

## 2022-06-24 NOTE — TELEPHONE ENCOUNTER
Refill Request        Last Seen: Last Seen Department: 10/5/2021  Last Seen by PCP: 10/5/2021    Last Written: 4/16/21, 12 refills    Next Appointment:   No future appointments.            Requested Prescriptions     Pending Prescriptions Disp Refills    montelukast (SINGULAIR) 10 MG tablet 30 tablet 5     Sig: Take 1 tablet by mouth nightly

## 2022-07-05 DIAGNOSIS — F90.0 ATTENTION DEFICIT HYPERACTIVITY DISORDER (ADHD), PREDOMINANTLY INATTENTIVE TYPE: ICD-10-CM

## 2022-07-05 DIAGNOSIS — J45.41 MODERATE PERSISTENT ASTHMA WITH EXACERBATION: ICD-10-CM

## 2022-07-05 DIAGNOSIS — F33.0 MAJOR DEPRESSIVE DISORDER, RECURRENT EPISODE, MILD (HCC): ICD-10-CM

## 2022-07-05 RX ORDER — IPRATROPIUM BROMIDE AND ALBUTEROL SULFATE 2.5; .5 MG/3ML; MG/3ML
1 SOLUTION RESPIRATORY (INHALATION) EVERY 6 HOURS PRN
Qty: 90 ML | Refills: 1 | Status: SHIPPED | OUTPATIENT
Start: 2022-07-05

## 2022-07-05 RX ORDER — DEXTROAMPHETAMINE SACCHARATE, AMPHETAMINE ASPARTATE, DEXTROAMPHETAMINE SULFATE AND AMPHETAMINE SULFATE 7.5; 7.5; 7.5; 7.5 MG/1; MG/1; MG/1; MG/1
30 TABLET ORAL
Qty: 30 TABLET | Refills: 0 | Status: SHIPPED | OUTPATIENT
Start: 2022-07-05 | End: 2022-07-29 | Stop reason: SDUPTHER

## 2022-07-05 NOTE — TELEPHONE ENCOUNTER
Filled; pt needs to come to August 15, 2022 appointment    Repeat CDA/UDS then    1. Moderate persistent asthma with exacerbation  - ipratropium-albuterol (DUONEB) 0.5-2.5 (3) MG/3ML SOLN nebulizer solution; Inhale 3 mLs into the lungs every 6 hours as needed for Shortness of Breath  Dispense: 90 mL; Refill: 1    2. Attention deficit hyperactivity disorder (ADHD), predominantly inattentive type  3. Major depressive disorder, recurrent episode, mild (HCC)  - amphetamine-dextroamphetamine (ADDERALL, 30MG,) 30 MG tablet; Take 1 tablet by mouth Daily with lunch for 30 days. as directed. Dispense: 30 tablet; Refill: 0  - lisdexamfetamine (VYVANSE) 70 MG capsule; Take 1 capsule by mouth every morning for 30 days. Dispense: 30 capsule; Refill: 0    PDMP Monitoring:    Last PDMP Delma Jaimes as Reviewed [facility]):  Review User Review Instant Review Result   Lefty Lyle. 7/5/2022  2:11 PM Reviewed PDMP [1]     [unfilled]  Urine Drug Screenings (1 yr)     Drug Panel-PM-HI Res-UR Interp-A  Collected: 12/1/2021 12:22 PM (Final result)   Narrative: Referred out by:  [facility]  [address] [facility] [address]   Phone [phone]     Complete Results          Drug Panel-PM-HI Res-UR Interp-A  Collected: 8/5/2021  1:48 PM (Final result)   Narrative: Referred out by:  [facility]) - [address],  [place], [address] [facility]   Phone [phone]     Complete Results          Drug Panel-PM-HI Res-UR Interp-A  Collected: 3/11/2020  2:28 PM (Final result)   Narrative: Referred out by:  [facility]  [address] [facility] [address]   Phone [phone]     Complete Results              Medication Contract and Consent for Opioid Use Documents Filed     Patient Documents     Type of Document Status Date Received Received By Description    Medication Contract [Status Missing]  RINKU STANTON Controlled Substance Med. Agreement 4-1-14    Medication Contract Received 3/11/2020  2:46 PM MADELINE MCKEON Medication Contract

## 2022-07-05 NOTE — TELEPHONE ENCOUNTER
Refill Request - Controlled Substance         Last Seen Department: 10/5/2021  Last Seen by PCP: 10/5/2021    Last Written:   Crystal Ortega - 3/16  adderall - 6/2  vyvanse - 6/6      Last UDS: 12/1/21    Med Agreement Signed On: 10/6/21    Next Appointment: 8/15/2022       Requested Prescriptions     Pending Prescriptions Disp Refills    ipratropium-albuterol (DUONEB) 0.5-2.5 (3) MG/3ML SOLN nebulizer solution 90 mL 1    amphetamine-dextroamphetamine (ADDERALL, 30MG,) 30 MG tablet 30 tablet 0     Sig: Take 1 tablet by mouth Daily with lunch for 30 days. as directed.  lisdexamfetamine (VYVANSE) 70 MG capsule 30 capsule 0     Sig: Take 1 capsule by mouth every morning for 30 days.

## 2022-07-15 ENCOUNTER — HOSPITAL ENCOUNTER (EMERGENCY)
Age: 32
Discharge: HOME | End: 2022-07-15
Attending: EMERGENCY MEDICINE
Payer: MEDICAID

## 2022-07-15 ENCOUNTER — APPOINTMENT (OUTPATIENT)
Dept: GENERAL RADIOLOGY | Age: 32
End: 2022-07-15
Payer: MEDICAID

## 2022-07-15 VITALS
SYSTOLIC BLOOD PRESSURE: 160 MMHG | HEART RATE: 92 BPM | DIASTOLIC BLOOD PRESSURE: 91 MMHG | OXYGEN SATURATION: 100 % | WEIGHT: 186.6 LBS | RESPIRATION RATE: 16 BRPM | HEIGHT: 71 IN | BODY MASS INDEX: 26.12 KG/M2 | TEMPERATURE: 98.6 F

## 2022-07-15 DIAGNOSIS — J45.21 MILD INTERMITTENT ASTHMA WITH ACUTE EXACERBATION: Primary | ICD-10-CM

## 2022-07-15 PROCEDURE — 99283 EMERGENCY DEPT VISIT LOW MDM: CPT

## 2022-07-15 PROCEDURE — 71045 X-RAY EXAM CHEST 1 VIEW: CPT

## 2022-07-15 PROCEDURE — 6370000000 HC RX 637 (ALT 250 FOR IP): Performed by: EMERGENCY MEDICINE

## 2022-07-15 RX ORDER — PREDNISONE 20 MG/1
60 TABLET ORAL DAILY
Qty: 12 TABLET | Refills: 0 | Status: SHIPPED | OUTPATIENT
Start: 2022-07-15 | End: 2022-07-19

## 2022-07-15 RX ORDER — PREDNISONE 20 MG/1
60 TABLET ORAL ONCE
Status: COMPLETED | OUTPATIENT
Start: 2022-07-15 | End: 2022-07-15

## 2022-07-15 RX ORDER — IPRATROPIUM BROMIDE AND ALBUTEROL SULFATE 2.5; .5 MG/3ML; MG/3ML
2 SOLUTION RESPIRATORY (INHALATION) ONCE
Status: COMPLETED | OUTPATIENT
Start: 2022-07-15 | End: 2022-07-15

## 2022-07-15 RX ADMIN — IPRATROPIUM BROMIDE AND ALBUTEROL SULFATE 2 AMPULE: .5; 2.5 SOLUTION RESPIRATORY (INHALATION) at 22:42

## 2022-07-15 RX ADMIN — PREDNISONE 60 MG: 20 TABLET ORAL at 22:49

## 2022-07-15 ASSESSMENT — PAIN - FUNCTIONAL ASSESSMENT: PAIN_FUNCTIONAL_ASSESSMENT: 0-10

## 2022-07-15 ASSESSMENT — ENCOUNTER SYMPTOMS
COUGH: 1
SHORTNESS OF BREATH: 1
TROUBLE SWALLOWING: 0
VOICE CHANGE: 0
WHEEZING: 1
CHEST TIGHTNESS: 1

## 2022-07-15 ASSESSMENT — PAIN DESCRIPTION - ORIENTATION: ORIENTATION: MID

## 2022-07-15 ASSESSMENT — PAIN DESCRIPTION - LOCATION: LOCATION: CHEST

## 2022-07-15 ASSESSMENT — PAIN SCALES - GENERAL: PAINLEVEL_OUTOF10: 5

## 2022-07-15 ASSESSMENT — PAIN DESCRIPTION - DESCRIPTORS: DESCRIPTORS: DISCOMFORT

## 2022-07-16 NOTE — ED PROVIDER NOTES
[address] [facility]  eMERGENCY dEPARTMENT eNCOUnter      Pt Name: Michael Vincent  MRN: [id number]  [place] 1990  Date of evaluation: 7/15/2022  Provider: Eagle Kapadia MD    CHIEF COMPLAINT       Chief Complaint   Patient presents with    Cough     Coughing up thick green/yellow mucous. States usually steroids and abx clear it up. Feeling tightness and sob. HISTORY OF PRESENT ILLNESS   (Location/Symptom, Timing/Onset, Context/Setting, Quality, Duration, Modifying Factors, Severity)  Note limiting factors. Michael Vincent is a 32 y.o. male \with history of asthma who reports approximately 5 days of cough productive of green to yellow mucus. Patient reports mild chest tightness and shortness of breath. Patient denies any hemoptysis or leg swelling. Patient has any fever. He reports symptoms are moderate constant and worsening. HPI    Nursing Notes were reviewed. REVIEW OFSYSTEMS    (2-9 systems for level 4, 10 or more for level 5)     Review of Systems   Constitutional:  Negative for fever. HENT:  Negative for drooling, trouble swallowing and voice change. Eyes:  Negative for visual disturbance. Respiratory:  Positive for cough, chest tightness, shortness of breath and wheezing. Cardiovascular:  Negative for chest pain and palpitations. Neurological:  Negative for seizures and syncope. Psychiatric/Behavioral:  Negative for self-injury and suicidal ideas. Except as noted above the remainder of the review of systems was reviewed and negative.        PAST MEDICAL HISTORY     Past Medical History:   Diagnosis Date    ADHD (attention deficit hyperactivity disorder)     Allergic rhinitis     Asthma     Depression     RAD (reactive airway disease)          SURGICAL HISTORY       Past Surgical History:   Procedure Laterality Date    TYMPANOPLASTY Right 2019    had this past Summer    TYMPANOSTOMY TUBE PLACEMENT      x5 - also eardrum repaired (?), and had Ti's angina and separatefacial infection         CURRENT MEDICATIONS       Previous Medications    ALBUTEROL (PROVENTIL) (2.5 MG/3ML) 0.083% NEBULIZER SOLUTION    Take 3 mLs by nebulization every 6 hours as needed for Wheezing    ALBUTEROL SULFATE  (90 BASE) MCG/ACT INHALER    INHALE 2 PUFFS INTO THE LUNGS EVERY 6 HOURS AS NEEDED FOR WHEEZING    AMPHETAMINE-DEXTROAMPHETAMINE (ADDERALL, 30MG,) 30 MG TABLET    Take 1 tablet by mouth Daily with lunch for 30 days. as directed. BUPROPION (WELLBUTRIN XL) 300 MG EXTENDED RELEASE TABLET    Take 1 tablet by mouth every morning    CALCIUM CARBONATE (TUMS) 500 MG CHEWABLE TABLET    Take 1 tablet by mouth daily. CETIRIZINE (ZYRTEC) 10 MG TABLET    Take 1 tablet by mouth daily As needed for allergies. FLUTICASONE (FLONASE) 50 MCG/ACT NASAL SPRAY    SHAKE LIQUID AND USE 1 TO 2 SPRAYS IN EACH NOSTRIL DAILY AS NEEDED FOR CONGESTION OR ALLERGIES    IPRATROPIUM-ALBUTEROL (DUONEB) 0.5-2.5 (3) MG/3ML SOLN NEBULIZER SOLUTION    Inhale 3 mLs into the lungs every 6 hours as needed for Shortness of Breath    LISDEXAMFETAMINE (VYVANSE) 70 MG CAPSULE    Take 1 capsule by mouth every morning for 30 days.     MONTELUKAST (SINGULAIR) 10 MG TABLET    Take 1 tablet by mouth nightly    MONTELUKAST (SINGULAIR) 10 MG TABLET    Take 1 tablet by mouth nightly    NAPROXEN SODIUM (ALEVE) 220 MG CAPS    Take by mouth    OMEPRAZOLE (PRILOSEC) 40 MG DELAYED RELEASE CAPSULE    Take 1 capsule by mouth daily    PULMICORT FLEXHALER 90 MCG/ACT AEPB INHALER    INHALE 2 PUFFS INTO THE LUNGS TWICE DAILY    QVAR REDIHALER 40 MCG/ACT AERB INHALER    INHALE 1 PUFF PO BID    SEREVENT DISKUS 50 MCG/DOSE DISKUS INHALER    INHALE 1 PUFF INTO THE LUNGS TWICE DAILY       ALLERGIES     Advair [fluticasone-salmeterol] and Erythromycin    FAMILY HISTORY       Family History   Problem Relation Age of Onset    Depression Mother     Hearing Loss Father     Heart Disease Father 61        MI - smoker SOCIAL HISTORY       Social History     Socioeconomic History    Marital status: Single    Years of education: 17    Highest education level: Bachelor's degree (e.g., BA, AB, BS)   Occupational History    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[employer] [address]    Smoking status: Former     Packs/day: 0.75     Years: 3.00     Pack years: 2.25     Types: Cigarettes     Quit date: 2014     Years since quittin.4    Smokeless tobacco: Former   Vaping Use    Vaping Use: Never used   Substance and Sexual Activity    Alcohol use: Yes     Alcohol/week: 5.0 - 6.0 standard drinks     Types: 5 - 6 Cans of beer per week    Drug use: No    Sexual activity: Yes     Partners: Female     Birth control/protection: I.U.D., Condom         PHYSICAL EXAM    (up to 7 for level 4, 8 or more for level 5)     ED Triage Vitals [07/15/22 2130]   BP Temp Temp Source Heart Rate Resp SpO2 Height Weight   (!) 160/91 98.6 °F (37 °C) Oral (!) 105 16 98 % 5' 11\" (1.803 m) 186 lb 9.6 oz (84.6 kg)       Physical Exam  Vitals and nursing note reviewed. Constitutional:       General: He is not in acute distress. Appearance: He is well-developed. HENT:      Head: Normocephalic and atraumatic. Eyes:      Conjunctiva/sclera: Conjunctivae normal.   Neck:      Vascular: No JVD. Trachea: No tracheal deviation. Cardiovascular:      Rate and Rhythm: Normal rate. Pulmonary:      Effort: Pulmonary effort is normal. No respiratory distress. Breath sounds: Wheezing present. Comments: Normal work of breathing. Patient speaking full sentences. Moderate and expiratory wheezing bilaterally. Prolonged expiratory phase consistent with obstructive lung disease. Musculoskeletal:      Comments: No lower extremity swelling, tenderness, or palpable cord. Negative Issac test bilaterally. Skin:     General: Skin is warm and dry. Neurological:      Mental Status: He is alert.        DIAGNOSTIC RESULTS       RADIOLOGY:     Interpretation per the Radiologist below, if available at the time of this note:    XR CHEST PORTABLE   Final Result   1. No findings for acute cardiopulmonary disease. EMERGENCY DEPARTMENT COURSE and DIFFERENTIAL DIAGNOSIS/MDM:   Vitals:    Vitals:    07/15/22 2130 07/15/22 2243   BP: (!) 160/91    Pulse: (!) 105 92   Resp: 16 16   Temp: 98.6 °F (37 °C)    TempSrc: Oral    SpO2: 98% 100%   Weight: 186 lb 9.6 oz (84.6 kg)    Height: 5' 11\" (1.803 m)            MDM  Patient tachycardic in triage however his rate normalizes upon reevaluation. History consistent with asthma exacerbation. We have discussed possibility of pulm embolism or acute emergent cardiac pathology however patient politely declines blood work-up at this time. He is agreeable chest x-ray which is unremarkable. Patient given steroids pretreatment with substantial treatment symptoms. He is offered COVID testing but politely declines. Feel patient is appropriate for discharge home with steroids and primary care follow-up. Patient expresses understanding and agreement with this plan. I have considered and evaluated for the following diagnoses and estimate there is low risk for acute coronary syndrome, pericardial tamponade, pneumothorax, pulmonary embolism, sepsis, aortic dissection, or severe case of COPD and/or pneumonia which require inpatient care and thus I consider the discharge disposition reasonable. We have discussed the diagnosis and risks, and we agree with discharging home to follow-up with their primary doctor. We also discussed returning to the Emergency Department immediately if new or worsening symptoms occur. We have discussed the symptoms which are most concerning (e.g., worsening shortness of breath or trouble breathing, chest pain, blueness around the lips or extremities, or other concerning sympoms) that necessitate immediate return. Procedures    FINAL IMPRESSION      1.  Mild intermittent asthma with acute exacerbation DISPOSITION/PLAN   DISPOSITION Decision To Discharge 07/15/2022 11:00:29 PM      PATIENT REFERRED TO:  Gloria Mullen. Cristina Elena., DO  [address]  [place] [address]  [phone]    In 1 week      [address] Emergency Department  [address] [place] [address] [place]  [phone]    If symptoms worsen      MEDICATIONS:  New Prescriptions    PREDNISONE (DELTASONE) 20 MG TABLET    Take 3 tablets by mouth in the morning for 4 days. (Please note that portions of this note were completed with a voice recognition program.  Efforts were made to edit the dictations but occasionally words aremis-transcribed. )    Fozia Junior MD (electronically signed)  Attending Emergency Physician           Fozia Junior MD  07/15/22 8064

## 2022-07-29 DIAGNOSIS — F33.0 MAJOR DEPRESSIVE DISORDER, RECURRENT EPISODE, MILD (HCC): ICD-10-CM

## 2022-07-29 DIAGNOSIS — F90.0 ATTENTION DEFICIT HYPERACTIVITY DISORDER (ADHD), PREDOMINANTLY INATTENTIVE TYPE: ICD-10-CM

## 2022-07-29 RX ORDER — DEXTROAMPHETAMINE SACCHARATE, AMPHETAMINE ASPARTATE, DEXTROAMPHETAMINE SULFATE AND AMPHETAMINE SULFATE 7.5; 7.5; 7.5; 7.5 MG/1; MG/1; MG/1; MG/1
TABLET ORAL
Qty: 30 TABLET | Refills: 0 | Status: SHIPPED | OUTPATIENT
Start: 2022-07-29 | End: 2022-08-15 | Stop reason: SDUPTHER

## 2022-07-29 NOTE — TELEPHONE ENCOUNTER
Refill Request - Controlled Substance      Last Seen Department: 10/5/2021  Last Seen by PCP: 10/5/2021    Last Written: 07/05/2022    Last UDS: 12/01/2021    Med Agreement Signed On: 10/06/2021    Next Appointment:   Future Appointments   Date Time Provider Bryce Curran   8/15/2022 10:00 AM Tara Peraza. Paulette White, DO [facility]             Requested Prescriptions     Pending Prescriptions Disp Refills    amphetamine-dextroamphetamine (ADDERALL, 30MG,) 30 MG tablet 30 tablet 0     Sig: Take 1 tablet by mouth Daily with lunch for 30 days. as directed. lisdexamfetamine (VYVANSE) 70 MG capsule 30 capsule 0     Sig: Take 1 capsule by mouth every morning for 30 days.

## 2022-07-30 NOTE — TELEPHONE ENCOUNTER
Pt needs to come to 8/15/22 in order to get next 3 months refills because may not be able to be seen right away  at new office if chooses to follow me to next practice. 1. Attention deficit hyperactivity disorder (ADHD), predominantly inattentive type  2. Major depressive disorder, recurrent episode, mild (HCC)  - amphetamine-dextroamphetamine (ADDERALL, 30MG,) 30 MG tablet; Take 1 tablet by mouth Daily with lunch for 30 days. as directed. Dispense: 30 tablet; Refill: 0  - lisdexamfetamine (VYVANSE) 70 MG capsule; Take 1 capsule by mouth every morning for 30 days. Dispense: 30 capsule; Refill: 0  PDMP Monitoring:    Last PDMP Jacob Crews as Reviewed [facility]):  Review User Review Instant Review Result   Art Shayne. 7/29/2022 10:58 PM Reviewed PDMP [1]     [unfilled]  Urine Drug Screenings (1 yr)       Drug Panel-PM-HI Res-UR Interp-A  Collected: 12/1/2021 12:22 PM (Final result)   Narrative: Referred out by:  [facility]  [address] [facility]   Phone [phone]             Drug Panel-PM-HI Res-UR Interp-A  Collected: 8/5/2021  1:48 PM (Final result)   Narrative: Referred out by:  [facility]) - [address]   Phone [phone]             Drug Panel-PM-HI Res-UR Interp-A  Collected: 3/11/2020  2:28 PM (Final result)   Narrative: Referred out by:  [facility]  [address] [facility] [employer] [address]   Phone [phone]                 Medication Contract and Consent for Opioid Use Documents Filed       Patient Documents       Type of Document Status Date Received Received By Description    Medication Contract [Status Missing]  RINKU STANTON Controlled Substance Med.  Agreement 4-1-14    Medication Contract Received 3/11/2020  2:46 PM MADELINE MCKEON Medication Contract

## 2022-08-13 ENCOUNTER — HOSPITAL ENCOUNTER (EMERGENCY)
Age: 32
Discharge: HOME | End: 2022-08-13
Attending: EMERGENCY MEDICINE
Payer: MEDICAID

## 2022-08-13 VITALS
DIASTOLIC BLOOD PRESSURE: 88 MMHG | HEART RATE: 108 BPM | OXYGEN SATURATION: 97 % | TEMPERATURE: 98.3 F | RESPIRATION RATE: 16 BRPM | SYSTOLIC BLOOD PRESSURE: 144 MMHG | BODY MASS INDEX: 26.7 KG/M2 | WEIGHT: 190.7 LBS | HEIGHT: 71 IN

## 2022-08-13 DIAGNOSIS — J45.21 MILD INTERMITTENT ASTHMATIC BRONCHITIS WITH ACUTE EXACERBATION: ICD-10-CM

## 2022-08-13 DIAGNOSIS — J45.21 MILD INTERMITTENT ASTHMA WITH EXACERBATION: Primary | ICD-10-CM

## 2022-08-13 PROCEDURE — 6370000000 HC RX 637 (ALT 250 FOR IP): Performed by: EMERGENCY MEDICINE

## 2022-08-13 PROCEDURE — 99283 EMERGENCY DEPT VISIT LOW MDM: CPT

## 2022-08-13 RX ORDER — AZITHROMYCIN 250 MG/1
TABLET, FILM COATED ORAL
Qty: 6 TABLET | Refills: 0 | Status: SHIPPED | OUTPATIENT
Start: 2022-08-13 | End: 2022-08-23

## 2022-08-13 RX ORDER — ALBUTEROL SULFATE 90 UG/1
2 AEROSOL, METERED RESPIRATORY (INHALATION) EVERY 6 HOURS PRN
Qty: 18 G | Refills: 1 | Status: SHIPPED | OUTPATIENT
Start: 2022-08-13

## 2022-08-13 RX ORDER — PREDNISONE 20 MG/1
TABLET ORAL
Qty: 12 TABLET | Refills: 0 | Status: SHIPPED | OUTPATIENT
Start: 2022-08-13 | End: 2022-08-23

## 2022-08-13 RX ORDER — AZITHROMYCIN 250 MG/1
500 TABLET, FILM COATED ORAL ONCE
Status: COMPLETED | OUTPATIENT
Start: 2022-08-13 | End: 2022-08-13

## 2022-08-13 RX ORDER — PREDNISONE 20 MG/1
60 TABLET ORAL ONCE
Status: COMPLETED | OUTPATIENT
Start: 2022-08-13 | End: 2022-08-13

## 2022-08-13 RX ADMIN — PREDNISONE 60 MG: 20 TABLET ORAL at 04:50

## 2022-08-13 RX ADMIN — AZITHROMYCIN MONOHYDRATE 500 MG: 250 TABLET ORAL at 04:53

## 2022-08-13 NOTE — ED NOTES
Pt dc/d with instructions and rx in stable condition[facility]       Alessandra Garcia RN  08/13/22 [id number]

## 2022-08-13 NOTE — ED PROVIDER NOTES
[address] [facility] Emergency Department      Pt Name: Michael Vincent  MRN: [id number]  [place] 1990  Date of evaluation: 8/13/2022  Provider: Maria Luisa Garcai MD  CHIEF COMPLAINT  Chief Complaint   Patient presents with    Shortness of Breath     Prod cough , hx asthma     HPI  Michael Vincent is a 32 y.o. male who presents because of difficulty breathing. He says he has been having symptoms for about 5 weeks. He was seen here about a month ago. He asked the physician to prescribe him an antibiotic as well as the other medicine. He did not get an antibiotic prescription. He denies any fever or chills. He does state that he needs to use his nebulizer every few hours in order to be comfortable. He felt pretty good while he was taking the steroid after the last ED visit but then his symptoms lapsed again. He has had nasal congestion and drainage from his sinuses. He denies any chest pain other than the discomfort from the asthma. His last nebulized respiratory treatment was about 1 hour prior to arrival.  He says that he tested several times this week for COVID and they were all negative. He says that he tests pretty regularly at his job, at least once per week. He says he has this exact same illness 3 or 4 times per year. REVIEW OF SYSTEMS:  No fever, no chest pain, no abdominal pain, sick contacts occasionally at work Pertinent positives and negatives as per the HPI. All other review of systems reviewed and negative. Nursing notes reviewed.     PAST MEDICAL HISTORY  Past Medical History:   Diagnosis Date    ADHD (attention deficit hyperactivity disorder)     Allergic rhinitis     Asthma     Depression     RAD (reactive airway disease)      SURGICAL HISTORY  Past Surgical History:   Procedure Laterality Date    TYMPANOPLASTY Right 2019    had this past Summer    TYMPANOSTOMY TUBE PLACEMENT      x5 - also eardrum repaired (?), and had Ti's angina and separatefacial infection     MEDICATIONS:  No current facility-administered medications on file prior to encounter. Current Outpatient Medications on File Prior to Encounter   Medication Sig Dispense Refill    buPROPion (WELLBUTRIN XL) 300 MG extended release tablet Take 1 tablet by mouth every morning 30 tablet 5    amphetamine-dextroamphetamine (ADDERALL, 30MG,) 30 MG tablet Take 1 tablet by mouth Daily with lunch for 30 days. as directed. 30 tablet 0    lisdexamfetamine (VYVANSE) 70 MG capsule Take 1 capsule by mouth every morning for 30 days. 30 capsule 0    ipratropium-albuterol (DUONEB) 0.5-2.5 (3) MG/3ML SOLN nebulizer solution Inhale 3 mLs into the lungs every 6 hours as needed for Shortness of Breath 90 mL 1    montelukast (SINGULAIR) 10 MG tablet Take 1 tablet by mouth nightly 30 tablet 5    omeprazole (PRILOSEC) 40 MG delayed release capsule Take 1 capsule by mouth daily 90 capsule 3    PULMICORT FLEXHALER 90 MCG/ACT AEPB inhaler INHALE 2 PUFFS INTO THE LUNGS TWICE DAILY 1 each 3    SEREVENT DISKUS 50 MCG/DOSE diskus inhaler INHALE 1 PUFF INTO THE LUNGS TWICE DAILY 60 each 5    albuterol sulfate  (90 Base) MCG/ACT inhaler INHALE 2 PUFFS INTO THE LUNGS EVERY 6 HOURS AS NEEDED FOR WHEEZING 6.7 g 2    cetirizine (ZYRTEC) 10 MG tablet Take 1 tablet by mouth daily As needed for allergies. 90 tablet 3    fluticasone (FLONASE) 50 MCG/ACT nasal spray SHAKE LIQUID AND USE 1 TO 2 SPRAYS IN EACH NOSTRIL DAILY AS NEEDED FOR CONGESTION OR ALLERGIES 3 Bottle 3    montelukast (SINGULAIR) 10 MG tablet Take 1 tablet by mouth nightly 30 tablet 12    QVAR REDIHALER 40 MCG/ACT AERB inhaler INHALE 1 PUFF PO BID  3    Naproxen Sodium (ALEVE) 220 MG CAPS Take by mouth      albuterol (PROVENTIL) (2.5 MG/3ML) 0.083% nebulizer solution Take 3 mLs by nebulization every 6 hours as needed for Wheezing 25 each 2    calcium carbonate (TUMS) 500 MG chewable tablet Take 1 tablet by mouth daily.        ALLERGIES  Advair [fluticasone-salmeterol] and Erythromycin  FAMILY HISTORY:  Family History   Problem Relation Age of Onset    Depression Mother     Hearing Loss Father     Heart Disease Father 61        MI - smoker     SOCIAL HISTORY:  Social History     Tobacco Use    Smoking status: Former     Packs/day: 0.75     Years: 3.00     Pack years: 2.25     Types: Cigarettes     Quit date: 2014     Years since quittin.5    Smokeless tobacco: Former   Vaping Use    Vaping Use: Never used   Substance Use Topics    Alcohol use:  Yes     Alcohol/week: 5.0 - 6.0 standard drinks     Types: 5 - 6 Cans of beer per week    Drug use: No     IMMUNIZATIONS:    Immunization History   Administered Date(s) Administered    COVID-19, PFIZER PURPLE top, DILUTE for use, (age 15 y+), 30mcg/0.3mL 2021, 2021, 2021    DTaP 1990, 1991, 1991, 1992, 1996    Hib, unspecified 1992    Influenza Vaccine, unspecified formulation 12/15/2005, 2006, 10/15/2007, 10/31/2008    Influenza Virus Vaccine 2003, 12/15/2005, 2006, 10/15/2007, 10/31/2008    Influenza Whole 1995, 1995, 1996, 1997, 01/10/2000    Influenza, MDCK Quadv, IM, PF (Flucelvax 2 yrs and older) 10/05/2021    Influenza, Tennie Mock, IM, PF (6 mo and older Fluzone, Flulaval, Fluarix, and 3 yrs and older Afluria) 2018, 10/14/2019, 2020    MMR 1992, 2002    Meningococcal MCV4P (Menactra) 10/15/2007    Meningococcal MPSV4 (Menomune) 10/15/2007    Polio IPV (IPOL) 1992, 1996    Polio OPV 1990, 1991, 1992       PHYSICAL EXAM  VITAL SIGNS:  BP (!) 144/88   Pulse (!) 108   Temp 98.3 °F (36.8 °C) (Oral)   Resp 16   Ht 5' 11\" (1.803 m)   Wt 190 lb 11.2 oz (86.5 kg)   SpO2 97%   BMI 26.60 kg/m²   Constitutional:  32 y.o. male alert, nontoxic  HENT:  Atraumatic, mucous membranes moist  Eyes:   Conjunctiva clear, no discharge, no icterus  Neck:  Supple, no adenopathy, no visible JVD, no stridor  Cardiovascular: Regular, no rubs  Thorax & Lungs:  No accessory muscle usage, faint wheeze, no rales  Abdomen:  Soft, non distended, bowel sounds present, nontender  Back:  No deformity  Genitalia:  Deferred  Rectal:  Deferred  Extremities:  No cyanosis, no tenderness, edema negative  Skin:  Warm, dry  Neurologic:  Alert, mentation normal  Psychiatric:  Affect appropriate    DIAGNOSTIC RESULTS:  he declined testing    ED COURSE:  he declined a neb treatment  Medications administered:  Medications   predniSONE (DELTASONE) tablet 60 mg (60 mg Oral Given 8/13/22 0450)   azithromycin (ZITHROMAX) tablet 500 mg (500 mg Oral Given 8/13/22 0453)     PROCEDURES:  None    CRITICAL CARE:  None    CONSULTATIONS:  None    MEDICAL DECISION MAKING: Michael Vincent is a 32 y.o. male who presented because of breathing difficulty. The patient is comfortably breathing but does have bronchospasm. He did not want a treatment since he has that medicine at home but does respectfully request an antibiotic. He indicates he did not receive one the last time and he has been through this so many times that he knows what he needs to start to feel better. Based on clinical presentation I do not believe he is experiencing symptoms from acute coronary syndrome, congestive heart failure, aortic dissection, pulmonary embolism, pneumothorax, myocarditis, Boerhaave syndrome, pericardial tamponade, acute abdomen, profound anemia or metabolic abnormality, etc.  The patient did decline testing. Michael Vincent was given appropriate discharge instructions. Referral to follow up provider. New Prescriptions    ALBUTEROL SULFATE HFA (PROVENTIL;VENTOLIN;PROAIR) 108 (90 BASE) MCG/ACT INHALER    Inhale 2 puffs into the lungs every 6 hours as needed for Wheezing At patient's preference may use 60 dose MDI.     AZITHROMYCIN (ZITHROMAX) 250 MG TABLET    2 TABS DAY 1, THEN 1 TAB DAYS 2-5    PREDNISONE (DELTASONE) 20 MG TABLET    40 mg po x 3 days then   20 mg po x 3 days then  10 mg po x 3 days for a total of 9 days, please dispense QS     FOLLOW UP:    Lili Inman. Jessica Aponte., DO  [address] [facility] [address]  [phone]    Schedule an appointment as soon as possible for a visit       [facility] Emergency Department  [facility] [address] [place] [address]  [phone]  Go to   If symptoms worsen    FINAL IMPRESSION:    1. Mild intermittent asthma with exacerbation    2. Mild intermittent asthmatic bronchitis with acute exacerbation        (Please note that I used voice recognition software to generate this note.   Occasionally words are mistranscribed despite my efforts to edit errors.)        Edgar Snow MD  08/13/22 [id number]

## 2022-08-15 ENCOUNTER — OFFICE VISIT (OUTPATIENT)
Dept: PRIMARY CARE CLINIC | Age: 32
End: 2022-08-15
Payer: MEDICAID

## 2022-08-15 VITALS
TEMPERATURE: 97.2 F | BODY MASS INDEX: 26.5 KG/M2 | HEART RATE: 88 BPM | WEIGHT: 190 LBS | SYSTOLIC BLOOD PRESSURE: 132 MMHG | OXYGEN SATURATION: 98 % | DIASTOLIC BLOOD PRESSURE: 88 MMHG

## 2022-08-15 DIAGNOSIS — Z23 NEED FOR PROPHYLACTIC VACCINATION AGAINST DIPHTHERIA-TETANUS-PERTUSSIS (DTP): ICD-10-CM

## 2022-08-15 DIAGNOSIS — E66.3 OVERWEIGHT (BMI 25.0-29.9): ICD-10-CM

## 2022-08-15 DIAGNOSIS — F90.0 ATTENTION DEFICIT HYPERACTIVITY DISORDER (ADHD), PREDOMINANTLY INATTENTIVE TYPE: Primary | ICD-10-CM

## 2022-08-15 DIAGNOSIS — J45.30 MILD PERSISTENT REACTIVE AIRWAY DISEASE WITHOUT COMPLICATION: ICD-10-CM

## 2022-08-15 DIAGNOSIS — F33.0 MAJOR DEPRESSIVE DISORDER, RECURRENT EPISODE, MILD (HCC): ICD-10-CM

## 2022-08-15 DIAGNOSIS — Z23 NEED FOR PROPHYLACTIC VACCINATION AGAINST STREPTOCOCCUS PNEUMONIAE (PNEUMOCOCCUS): ICD-10-CM

## 2022-08-15 DIAGNOSIS — Z11.4 SCREENING FOR HIV WITHOUT PRESENCE OF RISK FACTORS: ICD-10-CM

## 2022-08-15 DIAGNOSIS — Z11.59 NEED FOR HEPATITIS C SCREENING TEST: ICD-10-CM

## 2022-08-15 PROCEDURE — G8427 DOCREV CUR MEDS BY ELIG CLIN: HCPCS | Performed by: FAMILY MEDICINE

## 2022-08-15 PROCEDURE — 99214 OFFICE O/P EST MOD 30 MIN: CPT | Performed by: FAMILY MEDICINE

## 2022-08-15 PROCEDURE — G8419 CALC BMI OUT NRM PARAM NOF/U: HCPCS | Performed by: FAMILY MEDICINE

## 2022-08-15 PROCEDURE — 1036F TOBACCO NON-USER: CPT | Performed by: FAMILY MEDICINE

## 2022-08-15 RX ORDER — DEXTROAMPHETAMINE SACCHARATE, AMPHETAMINE ASPARTATE, DEXTROAMPHETAMINE SULFATE AND AMPHETAMINE SULFATE 7.5; 7.5; 7.5; 7.5 MG/1; MG/1; MG/1; MG/1
TABLET ORAL
Qty: 30 TABLET | Refills: 0 | Status: SHIPPED | OUTPATIENT
Start: 2022-08-15 | End: 2022-09-14

## 2022-08-15 SDOH — ECONOMIC STABILITY: FOOD INSECURITY: WITHIN THE PAST 12 MONTHS, THE FOOD YOU BOUGHT JUST DIDN'T LAST AND YOU DIDN'T HAVE MONEY TO GET MORE.: NEVER TRUE

## 2022-08-15 SDOH — ECONOMIC STABILITY: FOOD INSECURITY: WITHIN THE PAST 12 MONTHS, YOU WORRIED THAT YOUR FOOD WOULD RUN OUT BEFORE YOU GOT MONEY TO BUY MORE.: NEVER TRUE

## 2022-08-15 ASSESSMENT — PATIENT HEALTH QUESTIONNAIRE - PHQ9
8. MOVING OR SPEAKING SO SLOWLY THAT OTHER PEOPLE COULD HAVE NOTICED. OR THE OPPOSITE, BEING SO FIGETY OR RESTLESS THAT YOU HAVE BEEN MOVING AROUND A LOT MORE THAN USUAL: 0
7. TROUBLE CONCENTRATING ON THINGS, SUCH AS READING THE NEWSPAPER OR WATCHING TELEVISION: 0
5. POOR APPETITE OR OVEREATING: 0
4. FEELING TIRED OR HAVING LITTLE ENERGY: 1
9. THOUGHTS THAT YOU WOULD BE BETTER OFF DEAD, OR OF HURTING YOURSELF: 0
3. TROUBLE FALLING OR STAYING ASLEEP: 1
2. FEELING DOWN, DEPRESSED OR HOPELESS: 0
6. FEELING BAD ABOUT YOURSELF - OR THAT YOU ARE A FAILURE OR HAVE LET YOURSELF OR YOUR FAMILY DOWN: 0
SUM OF ALL RESPONSES TO PHQ QUESTIONS 1-9: 2
10. IF YOU CHECKED OFF ANY PROBLEMS, HOW DIFFICULT HAVE THESE PROBLEMS MADE IT FOR YOU TO DO YOUR WORK, TAKE CARE OF THINGS AT HOME, OR GET ALONG WITH OTHER PEOPLE: 1
1. LITTLE INTEREST OR PLEASURE IN DOING THINGS: 0
SUM OF ALL RESPONSES TO PHQ9 QUESTIONS 1 & 2: 0

## 2022-08-15 ASSESSMENT — ANXIETY QUESTIONNAIRES
6. BECOMING EASILY ANNOYED OR IRRITABLE: 1
IF YOU CHECKED OFF ANY PROBLEMS ON THIS QUESTIONNAIRE, HOW DIFFICULT HAVE THESE PROBLEMS MADE IT FOR YOU TO DO YOUR WORK, TAKE CARE OF THINGS AT HOME, OR GET ALONG WITH OTHER PEOPLE: NOT DIFFICULT AT ALL
4. TROUBLE RELAXING: 0
GAD7 TOTAL SCORE: 4
1. FEELING NERVOUS, ANXIOUS, OR ON EDGE: 1
2. NOT BEING ABLE TO STOP OR CONTROL WORRYING: 1
5. BEING SO RESTLESS THAT IT IS HARD TO SIT STILL: 0
7. FEELING AFRAID AS IF SOMETHING AWFUL MIGHT HAPPEN: 0
3. WORRYING TOO MUCH ABOUT DIFFERENT THINGS: 1

## 2022-08-15 ASSESSMENT — ENCOUNTER SYMPTOMS
CHEST TIGHTNESS: 1
COUGH: 1
BLOOD IN STOOL: 0
SHORTNESS OF BREATH: 1
CONSTIPATION: 0
WHEEZING: 1
DIARRHEA: 0
ABDOMINAL PAIN: 0

## 2022-08-15 ASSESSMENT — SOCIAL DETERMINANTS OF HEALTH (SDOH): HOW HARD IS IT FOR YOU TO PAY FOR THE VERY BASICS LIKE FOOD, HOUSING, MEDICAL CARE, AND HEATING?: SOMEWHAT HARD

## 2022-08-15 NOTE — PROGRESS NOTES
Librado Solis    1990    Consultants:  Patient Care Team:  Leon Peter DO as PCP - General (Family Medicine)  Sandraswati Graham. Marlyn Peter DO as PCP - [facility] Empaneled Provider    Chief Complaint:     Chief Complaint   Patient presents with    ADHD    Asthma    Depression    Shoulder Pain    Gastroesophageal Reflux    Overweight     HPI:    Librado Solis is a 32 y.o. male  established patient who presents for ADHD/medication check, MDD, asthma, shoulder impingement, GERD, Overweight, and health maintenance:    -ADHD:  Well controlled Vyvanse 70 mg daily  Adderall 30 mg daily  Appetite unchanged  Sleep varies depending on shift works  Did get medical marijuana prescription for insomnia    -Depression:  -Wellbutrin 300 mg daily, on and off it  PHQ-9 Total Score: 2 (8/15/2022 10:37 AM)  Thoughts that you would be better off dead, or of hurting yourself in some way: Not at all (8/15/2022 10:37 AM)  ELSIE 7 SCORE 8/15/2022 10/5/2021 4/16/2021 3/11/2020   ELSIE-7 Total Score 4 2 - -   ELSIE-7 Total Score - - 8 4     Asthma:  No formal PFT's  Reactive airway disease  Looked through chart  Interested in PFT's  Mostly related to environmental exposure at work  Singulair 10 mg daily  Albuterol inhaler 2 puffs PRN when triggered  Not using serevent or pulmicort inhalers or nebulizers  Breathing hasn't changed over time  Symptoms are normal for him  Gets exacerbation from infection taste in mouth, 2 x a year, takes abx and steroids    Shoulder pain:  Still present  Swimming some  Didn't follow up with PT    GERD:  Well controlled with Omeprazole 20 mg     Overweight:  Body mass index is 26.5 kg/m².   Wt Readings from Last 3 Encounters:   08/15/22 190 lb (86.2 kg)   08/13/22 190 lb 11.2 oz (86.5 kg)   07/15/22 186 lb 9.6 oz (84.6 kg)   Not eating healthy , Swimming some per patient    -Health Maintenance:  PCV20 due, defers today  -Hepatitis C, HIV screening deferred today  Patient Active Problem List   Diagnosis    GERD (gastroesophageal reflux disease)    Major depression    RAD (reactive airway disease)    ADHD (attention deficit hyperactivity disorder)    Myopic astigmatism, bilateral    Overweight (BMI 25.0-29. 9)     Past Medical History:    Past Medical History:   Diagnosis Date    ADHD (attention deficit hyperactivity disorder)     Allergic rhinitis     Asthma     Depression     RAD (reactive airway disease)        Past Surgical History:  Past Surgical History:   Procedure Laterality Date    TYMPANOPLASTY Right 2019    had this past Summer    TYMPANOSTOMY TUBE PLACEMENT      x5 - also eardrum repaired (?), and had Ti's angina and separatefacial infection       Home Meds:  Prior to Visit Medications    Medication Sig Taking? Authorizing Provider   azithromycin (ZITHROMAX) 250 MG tablet 2 TABS DAY 1, THEN 1 TAB DAYS 2-5 Yes Juve Hamilton MD   predniSONE (DELTASONE) 20 MG tablet 40 mg po x 3 days then   20 mg po x 3 days then  10 mg po x 3 days for a total of 9 days, please dispense QS Yes Juve Hamilton MD   albuterol sulfate HFA (PROVENTIL;VENTOLIN;PROAIR) 108 (90 Base) MCG/ACT inhaler Inhale 2 puffs into the lungs every 6 hours as needed for Wheezing At patient's preference may use 60 dose MDI. Yes Juve Hamilton MD   amphetamine-dextroamphetamine (ADDERALL, 30MG,) 30 MG tablet Take 1 tablet by mouth Daily with lunch for 30 days. as directed. Yes Callie Faustin [place]., DO   lisdexamfetamine (VYVANSE) 70 MG capsule Take 1 capsule by mouth every morning for 30 days. Yes Callie Faustin [place]., DO   ipratropium-albuterol (DUONEB) 0.5-2.5 (3) MG/3ML SOLN nebulizer solution Inhale 3 mLs into the lungs every 6 hours as needed for Shortness of Breath Yes Callie Faustin [place]., DO   montelukast (SINGULAIR) 10 MG tablet Take 1 tablet by mouth nightly Yes Callie Faustin [place]., DO   buPROPion (WELLBUTRIN XL) 300 MG extended release tablet Take 1 tablet by mouth every morning Yes Callie Faustin [place]., DO   omeprazole (PRILOSEC) 40 MG delayed release capsule Take 1 capsule by mouth daily Yes Bassam Guerra., DO   PULMICORT FLEXHALER 90 MCG/ACT AEPB inhaler INHALE 2 PUFFS INTO THE LUNGS TWICE DAILY Yes Jeralene Patient CAMRON Guerra., DO   SEREVENT DISKUS 50 MCG/DOSE diskus inhaler INHALE 1 PUFF INTO THE LUNGS TWICE DAILY Yes Fahadalene Patient CAMRON Guerra., DO   cetirizine (ZYRTEC) 10 MG tablet Take 1 tablet by mouth daily As needed for allergies. Yes Bassam Guerra., DO   fluticasone (FLONASE) 50 MCG/ACT nasal spray SHAKE LIQUID AND USE 1 TO 2 SPRAYS IN EACH NOSTRIL DAILY AS NEEDED FOR CONGESTION OR ALLERGIES Yes Fahadalene Patient CAMRON Guerra., DO   Naproxen Sodium 220 MG CAPS Take by mouth Yes Historical Provider, MD   albuterol (PROVENTIL) (2.5 MG/3ML) 0.083% nebulizer solution Take 3 mLs by nebulization every 6 hours as needed for Wheezing Yes Edilson Garza MD   calcium carbonate (TUMS) 500 MG chewable tablet Take 1 tablet by mouth daily. 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    Smoking status: Former     Packs/day: 0.75     Years: 3.00     Pack years: 2.25     Types: Cigarettes     Quit date: 2014     Years since quittin.5    Smokeless tobacco: Former   Vaping Use    Vaping Use: Never used   Substance and Sexual Activity    Alcohol use:  Yes     Alcohol/week: 5.0 - 6.0 standard drinks     Types: 5 - 6 Cans of beer per week    Drug use: No    Sexual activity: Yes     Partners: Female     Birth control/protection: I.U.D., Condom   Other Topics Concern    Not on file   Social History Narrative    Not on file     Social Determinants of Health     Financial Resource Strain: Medium Risk    Difficulty of Paying Living Expenses: Somewhat hard   Food Insecurity: No Food Insecurity    Worried About Running Out of Food in the Last Year: Never true    Ran Out of Food in the Last Year: Never true   Transportation Needs: Not on file   Physical Activity: Not on file   Stress: Not on file   Social Connections: Not on file   Intimate Partner Violence: Not on file   Housing Stability: Not on file         Health Maintenance Completed:  Health Maintenance   Topic Date Due    HIV screen  Never done    Hepatitis C screen  Never done    Varicella vaccine (1 of 2 - 2-dose childhood series) 09/05/2022 (Originally 10/16/1991)    Pneumococcal 0-64 years Vaccine (1 - PCV) 02/15/2023 (Originally 10/16/1996)    DTaP/Tdap/Td vaccine (6 - Tdap) 08/15/2023 (Originally 10/16/2001)    Flu vaccine (1) 09/01/2022    Depression Monitoring  10/05/2022    Hib vaccine  Completed    Meningococcal (ACWY) vaccine  Completed    COVID-19 Vaccine  Completed    Hepatitis A vaccine  Aged Out    Hepatitis B vaccine  Aged [employer] History   Administered Date(s) Administered    COVID-19, PFIZER PURPLE top, DILUTE for use, (age 15 y+), 30mcg/0.3mL 03/03/2021, 03/24/2021, 09/22/2021    DTaP 1990, 03/22/1991, 05/01/1991, 05/29/1992, 07/29/1996    Hib, unspecified 05/29/1992    Influenza Vaccine, unspecified formulation 12/15/2005, 11/02/2006, 10/15/2007, 10/31/2008    Influenza Virus Vaccine 09/26/2003, 12/15/2005, 11/02/2006, 10/15/2007, 10/31/2008    Influenza Whole 11/08/1995, 12/08/1995, 11/14/1996, 11/04/1997, 01/10/2000    Influenza, MDCK Quadv, IM, PF (Flucelvax 2 yrs and older) 10/05/2021    Influenza, Tin Aas, IM, PF (6 mo and older Fluzone, Flulaval, Fluarix, and 3 yrs and older Afluria) 11/05/2018, 10/14/2019, 11/02/2020    Influenza, Tin Aas, Recombinant, IM PF (Flublok 18 yrs and older) 02/13/2022    MMR 02/02/1992, 09/04/2002    Meningococcal MCV4P (Menactra) 10/15/2007    Meningococcal MPSV4 (Menomune) 10/15/2007    Polio IPV (IPOL) 05/29/1992, 07/29/1996    Polio OPV 1990, 03/22/1991, 05/01/1992       Review of Systems   Constitutional:  Negative for chills, fever and unexpected weight change. Eyes:  Negative for visual disturbance. Respiratory:  Positive for cough, chest tightness, shortness of breath and wheezing. Cardiovascular:  Negative for chest pain. Gastrointestinal:  Negative for abdominal pain, blood in stool, constipation and diarrhea. Endocrine: Negative for polyuria. Genitourinary:  Negative for dysuria and hematuria. Musculoskeletal:  Negative for arthralgias. Skin:  Negative for rash. Neurological:  Negative for weakness, numbness and headaches. Psychiatric/Behavioral:  Positive for dysphoric mood. The patient is not nervous/anxious. Vitals:    08/15/22 1030 08/15/22 1040 08/15/22 1150   BP: (!) 144/88 (!) 142/86 132/88   Site: Right Upper Arm     Position: Sitting     Cuff Size: Large Adult     Pulse: (!) 109  88   Temp: 97.2 °F (36.2 °C)     TempSrc: Temporal     SpO2: 98%     Weight: 190 lb (86.2 kg)       Body mass index is 26.5 kg/m². Wt Readings from Last 3 Encounters:   08/15/22 190 lb (86.2 kg)   08/13/22 190 lb 11.2 oz (86.5 kg)   07/15/22 186 lb 9.6 oz (84.6 kg)       BP Readings from Last 3 Encounters:   08/15/22 132/88   08/13/22 (!) 144/88   07/15/22 (!) 160/91       Physical Exam  Constitutional:       General: He is not in acute distress. Appearance: He is well-developed. HENT:      Head: Normocephalic and atraumatic. Right Ear: Tympanic membrane normal.      Left Ear: Tympanic membrane normal.      Nose: Nose normal. No rhinorrhea. Mouth/Throat:      Pharynx: Uvula midline. Eyes:      Pupils: Pupils are equal, round, and reactive to light. Neck:      Trachea: No tracheal deviation. Cardiovascular:      Rate and Rhythm: Normal rate and regular rhythm. Heart sounds: Normal heart sounds. No murmur heard. No friction rub. No gallop. Pulmonary:      Effort: Pulmonary effort is normal. No respiratory distress. Breath sounds: Normal breath sounds. No wheezing or rales. Abdominal:      General: Bowel sounds are normal. There is no distension. Palpations: Abdomen is soft. Tenderness: There is no abdominal tenderness. There is no rebound. Musculoskeletal:         General: No tenderness. Normal range of motion. Lymphadenopathy:      Cervical: No cervical adenopathy. Skin:     General: Skin is warm and dry. Findings: No erythema or rash. Neurological:      Mental Status: He is alert and oriented to person, place, and time. Cranial Nerves: No cranial nerve deficit. Psychiatric:         Speech: Speech normal.         Thought Content: Thought content does not include homicidal or suicidal ideation. Lab Review:   No visits with results within 6 Month(s) from this visit.    Latest known visit with results is:   Orders Only on 12/01/2021   Component Date Value    Drugs Expected 12/01/2021 VYVANSE     Pain Management Drug Pan* 12/01/2021 Inconsistent     Creatinine, Ur 12/01/2021 75.7     Codeine 12/01/2021 Not Detected     Morphine 12/01/2021 Not Detected     6-Acetylmorphine 12/01/2021 Not Detected     Oxycodone 12/01/2021 Not Detected     Noroxycodone 12/01/2021 Not Detected     Oxymorphone 12/01/2021 Not Detected     Noroxymorphone, Urine 12/01/2021 Not Detected     Hydrocodone 12/01/2021 Not Detected     Norhydrocodone, Urine 12/01/2021 Not Detected     Hydromorphone 12/01/2021 Not Detected     Naloxone 12/01/2021 Not Detected     Buprenorphine 12/01/2021 Not Detected     Norbuprenorphine 12/01/2021 Not Detected     Fentanyl 12/01/2021 Not Detected     Norfentanyl 12/01/2021 Not Detected     Meperidine 12/01/2021 Not Detected     Tapentadol, Urine 12/01/2021 Not Detected     Tapentadol-O-Sulfate, Ur* 12/01/2021 Not Detected     Methadone 12/01/2021 Not Detected     Tramadol 12/01/2021 Not Detected     Amphetamine 12/01/2021 Not Detected     Methamphetamine 12/01/2021 Not Detected     MDMA, Urine 12/01/2021 Not Detected     MDA 12/01/2021 Not Detected     MDEA 12/01/2021 Not Detected     Methylphenidate 12/01/2021 Not Detected     Phentermine 12/01/2021 Not Detected     Benzoylecgonine 12/01/2021 Not Detected     Alprazolam 12/01/2021 Not Detected     Alpha-OH-alprazolam 12/01/2021 Not Detected     Clonazepam 12/01/2021 Not Detected     7-aminoclonazepam 12/01/2021 Not Detected     Diazepam 12/01/2021 Not Detected     NORDIAZEPAM 12/01/2021 Not Detected     OXAZEPAM 12/01/2021 Not Detected     TEMAZEPAM 12/01/2021 Not Detected     Lorazepam 12/01/2021 Not Detected     Midazolam 12/01/2021 Not Detected     Zolpidem 12/01/2021 Not Detected     Gabapentin 12/01/2021 Not Detected     Pregabalin 12/01/2021 Not Detected     Alpha-OH-Midazolam, Urine 12/01/2021 Not Detected     Barbiturates 12/01/2021 Not Detected     Ethyl Glucuronide 12/01/2021 Not Detected     Marijuana Metabolite 12/01/2021 Not Detected     PCP 12/01/2021 Not Detected     CARISOPRODOL 12/01/2021 Not Detected     Pain Management Drug Pan* 12/01/2021 See Below     [facility] Pain Mgt Drug Panel,* 12/01/2021 See Note           Assessment/Plan:  Tricia Cho is 33 y/o male who  was seen today for adhd, asthma, depression, shoulder pain, gastroesophageal reflux and overweight. 1. Attention deficit hyperactivity disorder (ADHD), predominantly inattentive type  2.  Major depressive disorder, recurrent episode, mild (HCC)  -not at goal; continue wellbutrin for now, avoid SSRI as did not react well or discontinue well, continue vyvanse and adderall for now and wellbutrin 300 mg daily  CDA/UDS 12/21, pt with medical marijuana card per patient; repeat UDS/CDA at next OV in 3 months  - amphetamine-dextroamphetamine (ADDERALL, 30MG,) 30 MG tablet; Take 1 tablet by mouth Daily with lunch for 30 days. as directed. Dispense: 30 tablet; Refill: 0  - lisdexamfetamine (VYVANSE) 70 MG capsule; Take 1 capsule by mouth every morning for 30 days. Dispense: 30 capsule; Refill: 0    3. Mild persistent reactive airway disease without complication  Not at goal; on LABA, ICS, JOSE, and PRN nebulizer, as well as singulair  Get formal PFT's to determine optimization  - Spirometry With Bronchodilator; Future    4. Screening for HIV without presence of risk factors  - HIV Screen; Future    5. Need for hepatitis C screening test  - Hepatitis C Antibody; Future    6. Overweight (BMI 25.0-29.9)  -Recommend 150 minutes of cardiovascular activity a week, or 10,000 to 15,000 steps a day, 2 days of weightbearing  -dietary wise, try to stick to your weight x 10 in calories a day  -avoid processed/refined carbohydrates (boxed/canned/frozen/fast)  -encourage healthy protein and fat, butter, avocado, egg      7. Need for prophylactic vaccination against Streptococcus pneumoniae (pneumococcus)  8. Need for prophylactic vaccination against diphtheria-tetanus-pertussis (DTP)  Pt defers vaccines  today    Health Maintenance Due:  Health Maintenance Due   Topic Date Due    HIV screen  Never done    Hepatitis C screen  Never done      Immunizations:  Defers Tdap and PCV20 today    Return in about 3 months (around 11/15/2022). EMR Dragon/transcription disclaimer:  Much of this encounter note is electronic transcription/translation of spoken language to printed texts. The electronic translation of spoken language may be erroneous, or at times, nonsensical words or phrases may be inadvertently transcribed. Although I have reviewed the note for such errors, some may still exist.       Charlee Hanks.  Harpreet Ingram., DO  8/15/2022

## 2022-08-15 NOTE — PATIENT INSTRUCTIONS
-Set up [facility]    -[phone]      Can fill adderall script, given paper script for next fill    Follow up in 3-4 months  at other office  [address] [place] [address]  [phone] after September 12

## 2022-10-03 DIAGNOSIS — F33.0 MAJOR DEPRESSIVE DISORDER, RECURRENT EPISODE, MILD (HCC): ICD-10-CM

## 2022-10-03 RX ORDER — BUPROPION HYDROCHLORIDE 300 MG/1
TABLET ORAL
Qty: 30 TABLET | Refills: 1 | Status: SHIPPED | OUTPATIENT
Start: 2022-10-03

## 2022-10-03 NOTE — TELEPHONE ENCOUNTER
Refill Request       Last Seen: Last Seen Department: 8/15/2022  Last Seen by PCP: 8/15/2022    Last Written: 05/09/2022    Next Appointment:   No future appointments.           Requested Prescriptions      No prescriptions requested or ordered in this encounter

## 2022-10-03 NOTE — TELEPHONE ENCOUNTER
Patient last seen on 8/15 by Dr. Rajesh Alfaro. He was encouraged to follow-up in 3 months. Will provide 2 month prescription of this medication for now. Recommend patient see another provider in office for this follow-up and continued prescription.

## 2023-06-23 ENCOUNTER — HOSPITAL ENCOUNTER (EMERGENCY)
Age: 33
Discharge: HOME | End: 2023-06-24
Attending: EMERGENCY MEDICINE
Payer: OTHER MISCELLANEOUS

## 2023-06-23 DIAGNOSIS — F41.1 ANXIETY STATE: ICD-10-CM

## 2023-06-23 DIAGNOSIS — R03.0 ELEVATED BLOOD PRESSURE READING: ICD-10-CM

## 2023-06-23 DIAGNOSIS — R51.9 ACUTE NONINTRACTABLE HEADACHE, UNSPECIFIED HEADACHE TYPE: ICD-10-CM
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
## 2023-06-23 DIAGNOSIS — S06.0X0A CONCUSSION WITHOUT LOSS OF CONSCIOUSNESS, INITIAL ENCOUNTER: ICD-10-CM

## 2023-06-23 DIAGNOSIS — R11.0 NAUSEA: ICD-10-CM

## 2023-06-23 PROCEDURE — 99283 EMERGENCY DEPT VISIT LOW MDM: CPT

## 2023-06-23 PROCEDURE — 6370000000 HC RX 637 (ALT 250 FOR IP): Performed by: EMERGENCY MEDICINE

## 2023-06-23 RX ORDER — IBUPROFEN 600 MG/1
600 TABLET ORAL ONCE
Status: COMPLETED | OUTPATIENT
Start: 2023-06-23 | End: 2023-06-23

## 2023-06-23 RX ORDER — METHOCARBAMOL 750 MG/1
750 TABLET, FILM COATED ORAL 3 TIMES DAILY PRN
Qty: 15 TABLET | Refills: 0 | Status: SHIPPED | OUTPATIENT
Start: 2023-06-23 | End: 2023-06-28

## 2023-06-23 RX ORDER — METHOCARBAMOL 500 MG/1
1500 TABLET, FILM COATED ORAL ONCE
Status: COMPLETED | OUTPATIENT
Start: 2023-06-23 | End: 2023-06-23

## 2023-06-23 RX ORDER — ONDANSETRON 4 MG/1
4 TABLET, ORALLY DISINTEGRATING ORAL ONCE
Status: COMPLETED | OUTPATIENT
Start: 2023-06-23 | End: 2023-06-23

## 2023-06-23 RX ORDER — ONDANSETRON 4 MG/1
4 TABLET, ORALLY DISINTEGRATING ORAL 3 TIMES DAILY PRN
Qty: 21 TABLET | Refills: 0 | Status: SHIPPED | OUTPATIENT
Start: 2023-06-23

## 2023-06-23 RX ADMIN — METHOCARBAMOL 1500 MG: 500 TABLET ORAL at 23:52

## 2023-06-23 RX ADMIN — ONDANSETRON 4 MG: 4 TABLET, ORALLY DISINTEGRATING ORAL at 23:51

## 2023-06-23 RX ADMIN — IBUPROFEN 600 MG: 600 TABLET ORAL at 23:51

## 2023-06-23 ASSESSMENT — PAIN DESCRIPTION - DESCRIPTORS: DESCRIPTORS: POUNDING

## 2023-06-23 ASSESSMENT — PAIN - FUNCTIONAL ASSESSMENT
PAIN_FUNCTIONAL_ASSESSMENT: 0-10
PAIN_FUNCTIONAL_ASSESSMENT: ACTIVITIES ARE NOT PREVENTED

## 2023-06-23 ASSESSMENT — PAIN SCALES - GENERAL
PAINLEVEL_OUTOF10: 4
PAINLEVEL_OUTOF10: 4

## 2023-06-23 ASSESSMENT — PAIN DESCRIPTION - ONSET: ONSET: SUDDEN

## 2023-06-23 ASSESSMENT — PAIN DESCRIPTION - PAIN TYPE: TYPE: ACUTE PAIN

## 2023-06-23 ASSESSMENT — PAIN DESCRIPTION - ORIENTATION: ORIENTATION: POSTERIOR

## 2023-06-23 ASSESSMENT — PAIN DESCRIPTION - LOCATION: LOCATION: HEAD

## 2023-06-23 ASSESSMENT — PAIN DESCRIPTION - FREQUENCY: FREQUENCY: CONTINUOUS

## 2023-06-24 VITALS
OXYGEN SATURATION: 98 % | BODY MASS INDEX: 28.06 KG/M2 | SYSTOLIC BLOOD PRESSURE: 159 MMHG | TEMPERATURE: 98.5 F | WEIGHT: 200.4 LBS | DIASTOLIC BLOOD PRESSURE: 77 MMHG | RESPIRATION RATE: 16 BRPM | HEIGHT: 71 IN | HEART RATE: 102 BPM

## 2023-06-24 ASSESSMENT — PAIN - FUNCTIONAL ASSESSMENT
PAIN_FUNCTIONAL_ASSESSMENT: ACTIVITIES ARE NOT PREVENTED
PAIN_FUNCTIONAL_ASSESSMENT: 0-10

## 2023-06-24 ASSESSMENT — ENCOUNTER SYMPTOMS
BACK PAIN: 0
EYE PAIN: 0
COLOR CHANGE: 0
SINUS PAIN: 0
EYE REDNESS: 0
ABDOMINAL PAIN: 0
NAUSEA: 1
VOMITING: 0
SHORTNESS OF BREATH: 0
FACIAL SWELLING: 0
PHOTOPHOBIA: 0
CHEST TIGHTNESS: 0

## 2023-06-24 ASSESSMENT — PAIN DESCRIPTION - FREQUENCY: FREQUENCY: CONTINUOUS

## 2023-06-24 ASSESSMENT — PAIN DESCRIPTION - ONSET: ONSET: SUDDEN

## 2023-06-24 ASSESSMENT — PAIN DESCRIPTION - PAIN TYPE: TYPE: ACUTE PAIN

## 2023-06-24 ASSESSMENT — PAIN DESCRIPTION - DESCRIPTORS: DESCRIPTORS: POUNDING

## 2023-06-24 ASSESSMENT — PAIN SCALES - GENERAL: PAINLEVEL_OUTOF10: 4

## 2023-06-24 ASSESSMENT — PAIN DESCRIPTION - LOCATION: LOCATION: HEAD

## 2023-06-24 NOTE — DISCHARGE INSTRUCTIONS
Take Tylenol or ibuprofen as needed for pain. Use Robaxin as needed for muscle relaxer. Take Zofran for nausea. If you do develop any worsening headache with abnormal numbness or weakness, chest pain with shortness of breath, vomiting profusely, worst headache of life, abdominal pain, or any other concerns over the next 6 to 24 hours, then return to the emergency department immediately for further evaluation. Otherwise, follow-up with primary doctor in 2 to 4 days for further evaluation. Have your blood pressure rechecked in the next 1 to 2 weeks and if still elevated, you may need medication for this.
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
## 2023-06-24 NOTE — ED NOTES
Patient prepared for and ready to be discharged. Patient discharged at this time in no acute distress after verbalizing understanding of discharge instructions. Patient left after receiving After Visit Summary instructions.         Trace Vidal RN  06/24/23 0001

## 2023-06-24 NOTE — ED NOTES
Previous note entered on this patient's chart by mistake.  Deleted by original RN     Judy Jenkins RN  06/24/23 0062

## 2023-07-23 ENCOUNTER — HOSPITAL ENCOUNTER (EMERGENCY)
Age: 33
Discharge: HOME | End: 2023-07-23
Payer: MEDICAID

## 2023-07-23 VITALS
OXYGEN SATURATION: 100 % | SYSTOLIC BLOOD PRESSURE: 158 MMHG | RESPIRATION RATE: 16 BRPM | BODY MASS INDEX: 28 KG/M2 | HEIGHT: 71 IN | HEART RATE: 99 BPM | DIASTOLIC BLOOD PRESSURE: 101 MMHG | WEIGHT: 200 LBS | TEMPERATURE: 99.1 F

## 2023-07-23 DIAGNOSIS — H11.33 SUBCONJUNCTIVAL HEMORRHAGE OF BOTH EYES: Primary | ICD-10-CM

## 2023-07-23 PROCEDURE — 99282 EMERGENCY DEPT VISIT SF MDM: CPT

## 2023-07-23 ASSESSMENT — PAIN SCALES - GENERAL: PAINLEVEL_OUTOF10: 2

## 2023-07-23 ASSESSMENT — PAIN - FUNCTIONAL ASSESSMENT: PAIN_FUNCTIONAL_ASSESSMENT: 0-10

## 2023-07-23 NOTE — ED NOTES
Pt instructed to follow up with PCP. Assessed per Marybeth Ibrahim NP.      Lorena Hubbard, RABIA  02/40/23 [id number]
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
## 2023-08-28 LAB
ALBUMIN SERPL-MCNC: 4.7 GM/DL (ref 3.5–5.2)
ALP BLD-CCNC: 111 U/L (ref 40–129)
ALT SERPL-CCNC: 101 U/L
ANION GAP SERPL CALCULATED.3IONS-SCNC: 13 MMOL/L (ref 7–16)
AST SERPL-CCNC: 60 U/L
BASOPHILS # BLD: 1.5 %
BASOPHILS ABSOLUTE: 0.1 X10(3)/MCL (ref 0–0.1)
BILIRUB SERPL-MCNC: 0.5 MG/DL (ref 0.2–1.4)
BUN BLDV-MCNC: 14 MG/DL (ref 6–20)
CALCIUM SERPL-MCNC: 10 MG/DL (ref 8.6–10.4)
CHLORIDE BLD-SCNC: 100 MEQ/L (ref 98–107)
CHOLESTEROL, TOTAL: 226 MG/DL
CO2: 25 MMOL/L (ref 22–29)
CREAT SERPL-MCNC: 1.03 MG/DL (ref 0.67–1.3)
EGFR (CKD-EPI): 99 ML/MIN/1.73 M2
EOSINOPHIL # BLD: 7.7 %
EOSINOPHILS ABSOLUTE: 0.5 X10(3)/MCL (ref 0–0.5)
GLUCOSE BLD-MCNC: 113 MG/DL (ref 74–100)
HCT VFR BLD CALC: 44.8 % (ref 40–51)
HDLC SERPL-MCNC: 38 MG/DL
HEMOGLOBIN: 14.9 G/DL (ref 13.7–17.5)
IMMATURE CELLS ABSOLUTE COUNT: 0 X10(3)/MCL (ref 0–0.1)
IMMATURE GRANULOCYTES: 0.5 %
LDL CHOLESTEROL CALCULATED: 126 MG/DL
LYMPHOCYTES # BLD: 27.4 %
LYMPHOCYTES ABSOLUTE: 1.6 X10(3)/MCL (ref 1.2–3.9)
MCH RBC QN AUTO: 30.4 PG (ref 26–34)
MCHC RBC AUTO-ENTMCNC: 33.3 G/DL (ref 30.7–35.5)
MCV RBC AUTO: 91.4 FL (ref 80–100)
MONOCYTES # BLD: 11.5 %
MONOCYTES ABSOLUTE: 0.7 X10(3)/MCL (ref 0.3–0.9)
NEUTROPHILS ABSOLUTE: 3 X10(3)/MCL (ref 1.6–6.1)
NEUTROPHILS: 51.4 %
NONHDLC SERPL-MCNC: 188 MG/DL
PDW BLD-RTO: 12.8 %
PLATELET # BLD: 363 X10(3)/MCL (ref 155–369)
PMV BLD AUTO: 10.7 FL (ref 8.8–12.5)
POTASSIUM SERPL-SCNC: 4.4 MEQ/L (ref 3.5–5)
RBC # BLD: 4.9 X10(6)/MCL (ref 4.6–6.1)
SODIUM BLD-SCNC: 138 MEQ/L (ref 136–145)
TOTAL PROTEIN: 7.3 GM/DL (ref 6.4–8.3)
TRIGL SERPL-MCNC: 347 MG/DL
WBC # BLD: 5.8 X10(3)/MCL (ref 3.7–10.3)

## 2023-08-29 LAB
6-ACETYLMORPHINE, UR: <10 NG/ML
ALPHA-HYDROXYALPRAZOLAM, URINE: <25 NG/ML
ALPHA-HYDROXYMIDAZOLAM, URINE: <50 NG/ML
ALPHA-HYDROXYTRIAZOLAM, URINE: <50 NG/ML
ALPRAZOLAM: <8 NG/ML
AMINOCLONAZEPAM: <25 NG/ML
AMPHETAMINE, URINE: >2000 NG/ML
BARBITURATE SCREEN URINE: ABNORMAL
BENZOYLECGONINE SCREEN, URINE: <50 NG/ML
BUPRENORPHINE GLUCURONIDE URINE: <10 NG/ML
BUPRENORPHINE URINE: <5 NG/ML
BUTALBITAL, URINE: ABNORMAL
CANNABINOIDS CONF, URINE: 49 NG/ML
CARISOPRODOL, URINE: <100 NG/ML
CLONAZEPAM, URINE CONFIRM: <10 NG/ML
CODEINE, URINE: <50 NG/ML
CREATININE URINE: 230.8 MG/DL
DIAZEPAM URINE QUANT: <10 NG/ML
EDDP, URINE: <50 NG/ML
FENTANYL: <1 NG/ML
FLUNITRAZEPAM, URINE: <50 NG/ML
FLURAZEPAM, UR: <50 NG/ML
GABAPENTIN, URINE: <50 NG/ML
HYDROCODONE GC/MS CONF: <50 NG/ML
HYDROMORPHONE GC/MS CONF: <50 NG/ML
HYDROXYETHYLFLURAZEPAM, URINE: <50 NG/ML
LORAZEPAM: <50 NG/ML
Lab: <100 NG/ML
Lab: <25 NG/ML
Lab: <25 NG/ML
Lab: <50 NG/ML
Lab: ABNORMAL
Lab: ABNORMAL
MEPERIDINE, UR: <50 NG/ML
METHADONE: <50 NG/ML
METHAMPHETAMINE: <50 NG/ML
METHYLENEDIOXYAMPHETAMINE, UR: <50 NG/ML
METHYLENEDIOXYETHYLAMPHETAMINE, UR: <50 NG/ML
METHYLENEDIOXYMETHAMPHETAMINE, UR: <50 NG/ML
MIDAZOLAM URINE QUANT: <50 NG/ML
MORPHINE GC/MS CONF: <50 NG/ML
NALOXONE URINE: <25 NG/ML
NORBUPRENORPHINE GLUCURONIDE URINE: <10 NG/ML
NORBUPRENORPHINE, URINE: <5 NG/ML
NORDIAZEPAM: <25 NG/ML
NORFENTANYL: <1 NG/ML
NORHYDROCODONE (LCMSMS): <50 NG/ML
NOROXYCODONE: <50 NG/ML
NOROXYMORPHONE, URINE: <50 NG/ML
OXAZEPAM CONFIRMATION: <50 NG/ML
OXYCODONE: <50 NG/ML
OXYMORPHONE: <50 NG/ML
PENTOBARBITAL, UR: ABNORMAL
PHENOBARBITAL, UR: ABNORMAL
PHENTERMINE, URINE, QUANTITATIVE: <50 NG/ML
PREGABALIN, URINE: <50 NG/ML
PRESCRIBED MEDICATIONS: ABNORMAL
SECOBARBITAL, UR: ABNORMAL
TAPENTADOL GLUCURONIDE, URINE: <50 NG/ML
TAPENTADOL, URINE: <50 NG/ML
TEMAZEPAM: <50 NG/ML
THC GLUCURONIDE, URINE: 107 NG/ML
TRAMADOL: <50 NG/ML

## 2023-11-07 ENCOUNTER — HOSPITAL ENCOUNTER (EMERGENCY)
Age: 33
Discharge: HOME | End: 2023-11-07
Attending: EMERGENCY MEDICINE
Payer: MEDICAID

## 2023-11-07 VITALS
DIASTOLIC BLOOD PRESSURE: 111 MMHG | WEIGHT: 201.4 LBS | HEIGHT: 71 IN | OXYGEN SATURATION: 99 % | BODY MASS INDEX: 28.19 KG/M2 | HEART RATE: 106 BPM | SYSTOLIC BLOOD PRESSURE: 143 MMHG | RESPIRATION RATE: 14 BRPM | TEMPERATURE: 97.7 F

## 2023-11-07 DIAGNOSIS — J45.21 MILD INTERMITTENT ASTHMA WITH ACUTE EXACERBATION: Primary | ICD-10-CM

## 2023-11-07 PROCEDURE — 6370000000 HC RX 637 (ALT 250 FOR IP): Performed by: EMERGENCY MEDICINE

## 2023-11-07 PROCEDURE — 99283 EMERGENCY DEPT VISIT LOW MDM: CPT

## 2023-11-07 RX ORDER — DOXYCYCLINE HYCLATE 100 MG
100 TABLET ORAL 2 TIMES DAILY
Qty: 14 TABLET | Refills: 0 | Status: SHIPPED | OUTPATIENT
Start: 2023-11-07 | End: 2023-11-14

## 2023-11-07 RX ORDER — PREDNISONE 10 MG/1
60 TABLET ORAL DAILY
Qty: 30 TABLET | Refills: 0 | Status: SHIPPED | OUTPATIENT
Start: 2023-11-07 | End: 2023-11-12

## 2023-11-07 RX ORDER — DOXYCYCLINE 100 MG/1
100 CAPSULE ORAL ONCE
Status: COMPLETED | OUTPATIENT
Start: 2023-11-07 | End: 2023-11-07

## 2023-11-07 RX ORDER — PREDNISONE 20 MG/1
60 TABLET ORAL ONCE
Status: COMPLETED | OUTPATIENT
Start: 2023-11-07 | End: 2023-11-07

## 2023-11-07 RX ADMIN — PREDNISONE 60 MG: 20 TABLET ORAL at 22:25

## 2023-11-07 RX ADMIN — DOXYCYCLINE 100 MG: 100 CAPSULE ORAL at 22:25

## 2023-11-08 NOTE — DISCHARGE INSTRUCTIONS
Discharge home  Continue your nebulizers  Prednisone  Doxycycline  Follow-up with your family physician

## 2023-11-08 NOTE — ED NOTES
Pt discharged to home. 2 prescriptions given. Discharge paperwork discussed. All questions and concerns answered at this time. Pt awake and alert. Respirations even and unlabored.       Radha Stewart RN  11/07/23 1779

## 2023-11-08 NOTE — ED TRIAGE NOTES
Pt to ED for an asthma exacerbation. Pt states this happens a couple times a year and is helped by steroids. Pt awake and alert.
